# Patient Record
Sex: FEMALE | Race: WHITE | Employment: UNEMPLOYED | URBAN - METROPOLITAN AREA
[De-identification: names, ages, dates, MRNs, and addresses within clinical notes are randomized per-mention and may not be internally consistent; named-entity substitution may affect disease eponyms.]

---

## 2020-09-03 VITALS
TEMPERATURE: 97.8 F | HEART RATE: 76 BPM | WEIGHT: 160.6 LBS | HEIGHT: 64 IN | OXYGEN SATURATION: 97 % | BODY MASS INDEX: 27.42 KG/M2 | DIASTOLIC BLOOD PRESSURE: 75 MMHG | SYSTOLIC BLOOD PRESSURE: 132 MMHG

## 2020-09-03 RX ORDER — POTASSIUM CHLORIDE 20 MEQ/1
TABLET, EXTENDED RELEASE ORAL 2 TIMES DAILY
COMMUNITY

## 2020-09-03 RX ORDER — AMLODIPINE BESYLATE 10 MG/1
TABLET ORAL DAILY
COMMUNITY

## 2020-09-03 RX ORDER — FUROSEMIDE 20 MG/1
TABLET ORAL DAILY
COMMUNITY

## 2020-09-03 RX ORDER — POTASSIUM CHLORIDE 750 MG/1
TABLET, FILM COATED, EXTENDED RELEASE ORAL
COMMUNITY
End: 2022-02-10 | Stop reason: CLARIF

## 2021-10-13 ENCOUNTER — OFFICE VISIT (OUTPATIENT)
Dept: PODIATRY | Age: 86
End: 2021-10-13
Payer: MEDICARE

## 2021-10-13 VITALS
BODY MASS INDEX: 27.14 KG/M2 | TEMPERATURE: 96.6 F | SYSTOLIC BLOOD PRESSURE: 160 MMHG | HEIGHT: 64 IN | WEIGHT: 159 LBS | HEART RATE: 95 BPM | OXYGEN SATURATION: 98 % | DIASTOLIC BLOOD PRESSURE: 71 MMHG

## 2021-10-13 DIAGNOSIS — M20.11 HAV (HALLUX ABDUCTO VALGUS), RIGHT: ICD-10-CM

## 2021-10-13 DIAGNOSIS — M20.61 TOE DEFORMITY, ACQUIRED, RIGHT: Primary | ICD-10-CM

## 2021-10-13 PROCEDURE — 29550 STRAPPING OF TOES: CPT | Performed by: PODIATRIST

## 2021-10-13 PROCEDURE — G8427 DOCREV CUR MEDS BY ELIG CLIN: HCPCS | Performed by: PODIATRIST

## 2021-10-13 PROCEDURE — G8419 CALC BMI OUT NRM PARAM NOF/U: HCPCS | Performed by: PODIATRIST

## 2021-10-13 PROCEDURE — 1101F PT FALLS ASSESS-DOCD LE1/YR: CPT | Performed by: PODIATRIST

## 2021-10-13 PROCEDURE — G8510 SCR DEP NEG, NO PLAN REQD: HCPCS | Performed by: PODIATRIST

## 2021-10-13 PROCEDURE — 1090F PRES/ABSN URINE INCON ASSESS: CPT | Performed by: PODIATRIST

## 2021-10-13 PROCEDURE — G8536 NO DOC ELDER MAL SCRN: HCPCS | Performed by: PODIATRIST

## 2021-10-13 PROCEDURE — 99213 OFFICE O/P EST LOW 20 MIN: CPT | Performed by: PODIATRIST

## 2021-10-13 RX ORDER — SPIRONOLACTONE 100 MG/1
25 TABLET, FILM COATED ORAL DAILY
COMMUNITY

## 2021-10-13 NOTE — PROGRESS NOTES
[facility]    Subjective:         Patient is a 80 y.o. female who is being seen as a new pt for right foot pain. Patient states the pain is level of 3 out of 10. States pain is exacerbated with close her shoes and weightbearing. She denies any overt trauma. She states she did have surgery previously to remove a bunion deformity but she believes the deformity has returned. She also states she has an overlapping toe. She denies any changes in her activity level or shoe gear. She denies any recent diagnostic testing to interrogate her areas of concern. She denies any other pedal complaints    Past Medical History:   Diagnosis Date    Arthritis     Hx of colonoscopy with polypectomy     benign    Hypertension     Mitral valve regurgitation 2-14-11    mild per Dr. Ruddy Sofia Other ill-defined conditions     cataracts    Other ill-defined conditions 1-1-03    fx left ankle-boot only    Pneumonia ~ 7 yrs ago    Thyroid disease     hypothyroidism    Varicose veins     left leg worse than right currently     Past Surgical History:   Procedure Laterality Date    BREAST SURGERY PROCEDURE UNLISTED  1986    excision calcium deposit left breast    HX APPENDECTOMY      HX KNEE ARTHROSCOPY  8-29-07    right    HX OPEN CHOLECYSTECTOMY  1962    HX ORTHOPAEDIC  4-12-96    shahid. kelly's neuroma    HX ORTHOPAEDIC      right foot bunionectomy    HX ORTHOPAEDIC  7-11-01    removal of left foot heel spur    HX TONSILLECTOMY      HX TUBAL LIGATION      TOTAL KNEE ARTHROPLASTY  1-14-08    left    VASCULAR SURGERY PROCEDURE UNLIST  1979    left leg vein stripping       No family history on file. Social History     Tobacco Use    Smoking status: Never Smoker    Smokeless tobacco: Never Used   Substance Use Topics    Alcohol use: No     Allergies   Allergen Reactions    Prednisone Rash and Itching     Prior to Admission medications    Medication Sig Start Date End Date Taking? Authorizing Provider   spironolactone (ALDACTONE) 100 mg tablet Take  by mouth daily. Yes Provider, Historical   spironolactone (ALDACTONE) 10 mg/1 mL susp 10 mg/mL oral suspension (compounded) Take  by mouth. Yes Provider, Historical   amLODIPine (NORVASC) 10 mg tablet Take  by mouth daily. Yes Provider, Historical   furosemide (LASIX) 20 mg tablet Take  by mouth daily. Yes Provider, Historical   POTASSIUM PO Take  by mouth. Yes Provider, Historical   potassium chloride SR (KLOR-CON 10) 10 mEq tablet Take  by mouth. Yes Provider, Historical   potassium chloride (K-DUR, KLOR-CON) 20 mEq tablet Take  by mouth two (2) times a day. Yes Provider, Historical   CALCIUM CARBONATE/VITAMIN D3 (CALCIUM 600 + D,3, PO) Take 1 Tab by mouth two (2) times a day. Yes Provider, Historical   HYDROcodone-acetaminophen (LORTAB) 5-500 mg per tablet Take 1-2 Tabs by mouth every four (4) hours as needed for Pain. Patient not taking: Reported on 10/13/2021 2/8/12   Zuleika Melgoza MD   levothyroxine (SYNTHROID) 25 mcg tablet Take 25 mcg by mouth Daily (before breakfast). Indications: HYPOTHYROIDISM  Patient not taking: Reported on 10/13/2021    Provider, Historical   atenolol (TENORMIN) 100 mg tablet Take 100 mg by mouth daily. Indications: HYPERTENSION  Patient not taking: Reported on 10/13/2021    Provider, Historical   hydrochlorothiazide (HYDRODIURIL) 25 mg tablet Take 25 mg by mouth daily. Indications: HYPERTENSION  Patient not taking: Reported on 10/13/2021    Provider, Historical   lisinopril (PRINIVIL, ZESTRIL) 10 mg tablet Take 10 mg by mouth nightly. Indications: HYPERTENSION  Patient not taking: Reported on 10/13/2021    Provider, Historical   pravastatin (PRAVACHOL) 20 mg tablet Take 20 mg by mouth nightly. Indications: HYPERCHOLESTEROLEMIA  Patient not taking: Reported on 10/13/2021    Provider, Historical   aspirin 81 mg tablet Take 81 mg by mouth daily.     Patient not taking: Reported on 10/13/2021    Provider, Historical   multivitamin (ONE A DAY) tablet Take 1 Tab by mouth daily. Patient not taking: Reported on 10/13/2021    Provider, Historical   omega-3 fatty acids-vitamin e (FISH OIL) 1,000 mg Cap Take 1 Cap by mouth daily. Patient not taking: Reported on 10/13/2021    Provider, Historical   GLUCOSAMINE HCL/CHONDR SANFORD A NA (GLUCOSAMINE-CHONDROITIN) 750-600 mg Tab Take 2 Tabs by mouth daily. Patient not taking: Reported on 10/13/2021    Provider, Historical   OTHER Take 1,000 mg by mouth daily. Cinnamon and chromium     Provider, Historical       Review of Systems   Constitutional: Negative. HENT: Negative. Eyes: Negative. Respiratory: Negative. Cardiovascular: Positive for leg swelling. Gastrointestinal: Negative. Endocrine: Positive for cold intolerance. Genitourinary: Negative. Musculoskeletal: Positive for arthralgias. Skin: Negative. Allergic/Immunologic: Negative. Neurological: Negative. Hematological: Negative. Psychiatric/Behavioral: Negative. All other systems reviewed and are negative. Objective:     Visit Vitals  BP (!) 160/71 (BP 1 Location: Left upper arm, BP Patient Position: Sitting, BP Cuff Size: Large adult)   Pulse 95   Temp (!) 96.6 °F (35.9 °C) (Temporal)   Ht 5' 4\" (1.626 m)   Wt 159 lb (72.1 kg)   SpO2 98%   BMI 27.29 kg/m²       Physical Exam  Vitals reviewed. Constitutional:       Appearance: She is overweight. Cardiovascular:      Pulses:           Dorsalis pedis pulses are 2+ on the right side and 2+ on the left side. Posterior tibial pulses are 2+ on the right side and 2+ on the left side. Pulmonary:      Effort: Pulmonary effort is normal.   Musculoskeletal:      Right lower leg: No edema. Left lower leg: No edema. Right foot: Decreased range of motion. Deformity and bunion present. No Charcot foot. Left foot: Normal range of motion.  No deformity, bunion, Charcot foot or foot drop.   Feet:      Right foot:      Protective Sensation: 10 sites tested. 10 sites sensed. Skin integrity: Skin integrity normal.      Toenail Condition: Right toenails are normal.      Left foot:      Protective Sensation: 10 sites tested. 10 sites sensed. Skin integrity: Skin integrity normal.      Toenail Condition: Left toenails are normal.   Lymphadenopathy:      Lower Body: No right inguinal adenopathy. No left inguinal adenopathy. Skin:     General: Skin is warm. Capillary Refill: Capillary refill takes 2 to 3 seconds. Neurological:      Mental Status: She is alert and oriented to person, place, and time. Psychiatric:         Mood and Affect: Mood and affect normal.         Behavior: Behavior is cooperative. Data Review: No results found for this or any previous visit (from the past 24 hour(s)). Impression:       ICD-10-CM ICD-9-CM    1. Toe deformity, acquired, right  M20.61 735.9    2. Hav (hallux abducto valgus), right  M20.11 735.0        Recommendation:     Patient seen and evaluated in the office  Discussed and educated patient regarding her current medical condition  Treatment options reviewed, conservative versus procedural. Possible complications of each reviewed. Patient elected for conservative treatment at this time. A toe strapping with coban was performed to the right second toe without incident. Patient to perform as needed for symptomatic relief        Casper Resendiz, [address] and [facility]  [address] [place] [address] [address], [address]  O: [phone]  F: [phone]  C: [phone]

## 2021-10-13 NOTE — PROGRESS NOTES
Chief Complaint   Patient presents with    Callouses     R foot; also states R middle toe overlaps her great toe; states she giraldo bunion surgery and bunion came back     1. Have you been to the ER, urgent care clinic since your last visit? Hospitalized since your last visit? No    2. Have you seen or consulted any other health care providers outside of the [address] [place] [address] since your last visit? Include any pap smears or colon screening.  No  PCP-Dr Sabillon

## 2022-01-06 ENCOUNTER — OFFICE VISIT (OUTPATIENT)
Dept: PODIATRY | Age: 87
End: 2022-01-06
Payer: MEDICARE

## 2022-01-06 ENCOUNTER — HOSPITAL ENCOUNTER (OUTPATIENT)
Dept: GENERAL RADIOLOGY | Age: 87
Discharge: HOME | End: 2022-01-06
Payer: MEDICARE

## 2022-01-06 VITALS
DIASTOLIC BLOOD PRESSURE: 72 MMHG | TEMPERATURE: 97.7 F | HEIGHT: 64 IN | BODY MASS INDEX: 25.81 KG/M2 | HEART RATE: 87 BPM | SYSTOLIC BLOOD PRESSURE: 160 MMHG | WEIGHT: 151.2 LBS

## 2022-01-06 DIAGNOSIS — M20.11 HAV (HALLUX ABDUCTO VALGUS), RIGHT: ICD-10-CM

## 2022-01-06 DIAGNOSIS — M20.41 HAMMERTOE OF SECOND TOE OF RIGHT FOOT: Primary | ICD-10-CM

## 2022-01-06 DIAGNOSIS — M20.41 HAMMERTOE OF SECOND TOE OF RIGHT FOOT: ICD-10-CM

## 2022-01-06 PROCEDURE — G8432 DEP SCR NOT DOC, RNG: HCPCS | Performed by: PODIATRIST

## 2022-01-06 PROCEDURE — 73630 X-RAY EXAM OF FOOT: CPT

## 2022-01-06 PROCEDURE — 1101F PT FALLS ASSESS-DOCD LE1/YR: CPT | Performed by: PODIATRIST

## 2022-01-06 PROCEDURE — 1090F PRES/ABSN URINE INCON ASSESS: CPT | Performed by: PODIATRIST

## 2022-01-06 PROCEDURE — G8427 DOCREV CUR MEDS BY ELIG CLIN: HCPCS | Performed by: PODIATRIST

## 2022-01-06 PROCEDURE — G8419 CALC BMI OUT NRM PARAM NOF/U: HCPCS | Performed by: PODIATRIST

## 2022-01-06 PROCEDURE — G8536 NO DOC ELDER MAL SCRN: HCPCS | Performed by: PODIATRIST

## 2022-01-06 PROCEDURE — 99213 OFFICE O/P EST LOW 20 MIN: CPT | Performed by: PODIATRIST

## 2022-01-06 NOTE — PROGRESS NOTES
[facility]    Subjective:         Patient is a 80 y.o. female who is being seen as a returning pt for right foot pain. Patient states the pain is level of 3 out of 10. States pain is exacerbated with close her shoes and weightbearing. She denies any overt trauma. She states she did have surgery previously to remove a bunion deformity but she believes the deformity has returned. She also states she has an overlapping toe. She denies any changes in her activity level or shoe gear. She denies any recent diagnostic testing to interrogate her areas of concern. She states additional surgery was discussed last office visit but she had decided upon conservative treatment. She denies any other pedal complaints    Past Medical History:   Diagnosis Date    Arthritis     Hx of colonoscopy with polypectomy     benign    Hypertension     Mitral valve regurgitation 2-14-11    mild per Dr. Joaquín Araujo Other ill-defined conditions(799.89)     cataracts    Other ill-defined conditions(799.89) 1-1-03    fx left ankle-boot only    Pneumonia ~ 7 yrs ago    Thyroid disease     hypothyroidism    Varicose veins     left leg worse than right currently     Past Surgical History:   Procedure Laterality Date    HX APPENDECTOMY      HX KNEE ARTHROSCOPY  8-29-07    right    HX OPEN CHOLECYSTECTOMY  1962    HX ORTHOPAEDIC  4-12-96    shahid. kelly's neuroma    HX ORTHOPAEDIC      right foot bunionectomy    HX ORTHOPAEDIC  7-11-01    removal of left foot heel spur    HX TONSILLECTOMY      HX TUBAL LIGATION      GA BREAST SURGERY PROCEDURE UNLISTED  1986    excision calcium deposit left breast    GA TOTAL KNEE ARTHROPLASTY  1-14-08    left    VASCULAR SURGERY PROCEDURE UNLIST  1979    left leg vein stripping       History reviewed. No pertinent family history.    Social History     Tobacco Use    Smoking status: Never Smoker    Smokeless tobacco: Never Used   Substance Use Topics    Alcohol use: No     Allergies   Allergen Reactions    Prednisone Rash and Itching     Prior to Admission medications    Medication Sig Start Date End Date Taking? Authorizing Provider   spironolactone (ALDACTONE) 100 mg tablet Take  by mouth daily. Provider, Historical   spironolactone (ALDACTONE) 10 mg/1 mL susp 10 mg/mL oral suspension (compounded) Take  by mouth. Provider, Historical   amLODIPine (NORVASC) 10 mg tablet Take  by mouth daily. Provider, Historical   furosemide (LASIX) 20 mg tablet Take  by mouth daily. Provider, Historical   POTASSIUM PO Take  by mouth. Provider, Historical   potassium chloride SR (KLOR-CON 10) 10 mEq tablet Take  by mouth. Provider, Historical   potassium chloride (K-DUR, KLOR-CON) 20 mEq tablet Take  by mouth two (2) times a day. Provider, Historical   HYDROcodone-acetaminophen (LORTAB) 5-500 mg per tablet Take 1-2 Tabs by mouth every four (4) hours as needed for Pain. Patient not taking: Reported on 10/13/2021 2/8/12   Janeth Narayanan MD   levothyroxine (SYNTHROID) 25 mcg tablet Take 25 mcg by mouth Daily (before breakfast). Indications: HYPOTHYROIDISM  Patient not taking: Reported on 10/13/2021    Provider, Historical   atenolol (TENORMIN) 100 mg tablet Take 100 mg by mouth daily. Indications: HYPERTENSION  Patient not taking: Reported on 10/13/2021    Provider, Historical   hydrochlorothiazide (HYDRODIURIL) 25 mg tablet Take 25 mg by mouth daily. Indications: HYPERTENSION  Patient not taking: Reported on 10/13/2021    Provider, Historical   lisinopril (PRINIVIL, ZESTRIL) 10 mg tablet Take 10 mg by mouth nightly. Indications: HYPERTENSION  Patient not taking: Reported on 10/13/2021    Provider, Historical   pravastatin (PRAVACHOL) 20 mg tablet Take 20 mg by mouth nightly. Indications: HYPERCHOLESTEROLEMIA  Patient not taking: Reported on 10/13/2021    Provider, Historical   aspirin 81 mg tablet Take 81 mg by mouth daily.     Patient not taking: Reported on 10/13/2021    Provider, Historical   multivitamin (ONE A DAY) tablet Take 1 Tab by mouth daily. Patient not taking: Reported on 10/13/2021    Provider, Historical   CALCIUM CARBONATE/VITAMIN D3 (CALCIUM 600 + D,3, PO) Take 1 Tab by mouth two (2) times a day. Provider, Historical   omega-3 fatty acids-vitamin e (FISH OIL) 1,000 mg Cap Take 1 Cap by mouth daily. Patient not taking: Reported on 10/13/2021    Provider, Historical   GLUCOSAMINE HCL/CHONDR SANFORD A NA (GLUCOSAMINE-CHONDROITIN) 750-600 mg Tab Take 2 Tabs by mouth daily. Patient not taking: Reported on 10/13/2021    Provider, Historical   OTHER Take 1,000 mg by mouth daily. Cinnamon and chromium     Provider, Historical       Review of Systems   Constitutional: Negative. HENT: Negative. Eyes: Negative. Respiratory: Negative. Cardiovascular: Positive for leg swelling. Gastrointestinal: Negative. Endocrine: Positive for cold intolerance. Genitourinary: Negative. Musculoskeletal: Positive for arthralgias. Skin: Negative. Allergic/Immunologic: Negative. Neurological: Negative. Hematological: Negative. Psychiatric/Behavioral: Negative. All other systems reviewed and are negative. Objective:     Visit Vitals  BP (!) 160/72 (BP 1 Location: Left upper arm, BP Patient Position: Sitting, BP Cuff Size: Adult)   Pulse 87   Temp 97.7 °F (36.5 °C) (Temporal)   Ht 5' 4\" (1.626 m)   Wt 151 lb 3.2 oz (68.6 kg)   BMI 25.95 kg/m²       Physical Exam  Vitals reviewed. Constitutional:       Appearance: She is overweight. Cardiovascular:      Pulses:           Dorsalis pedis pulses are 2+ on the right side and 2+ on the left side. Posterior tibial pulses are 2+ on the right side and 2+ on the left side. Pulmonary:      Effort: Pulmonary effort is normal.   Musculoskeletal:      Right lower leg: No edema. Left lower leg: No edema. Right foot: Decreased range of motion.  Deformity and bunion present. No Charcot foot. Left foot: Normal range of motion. No deformity, bunion, Charcot foot or foot drop. Feet:      Right foot:      Protective Sensation: 10 sites tested. 10 sites sensed. Skin integrity: Skin integrity normal.      Toenail Condition: Right toenails are normal.      Left foot:      Protective Sensation: 10 sites tested. 10 sites sensed. Skin integrity: Skin integrity normal.      Toenail Condition: Left toenails are normal.   Lymphadenopathy:      Lower Body: No right inguinal adenopathy. No left inguinal adenopathy. Skin:     General: Skin is warm. Capillary Refill: Capillary refill takes 2 to 3 seconds. Neurological:      Mental Status: She is alert and oriented to person, place, and time. Psychiatric:         Mood and Affect: Mood and affect normal.         Behavior: Behavior is cooperative. Data Review: No results found for this or any previous visit (from the past 24 hour(s)). Impression:       ICD-10-CM ICD-9-CM    1. Hammertoe of second toe of right foot  M20.41 735.4 XR FOOT RT MIN 3 V   2. Hav (hallux abducto valgus), right  M20.11 735.0 XR FOOT RT MIN 3 V         Recommendation:     Patient seen and evaluated in the office  Discussed and educated patient regarding her current medical condition  As patient feels she has failed conservative treatment and she would like to possibly move forward with surgical intervention. X-rays ordered to evaluate right foot deformities and we will discuss treatment options in more depth when imaging has been performed        Casper Mora, [address] [facility], [address] [place] [address] and [facility]  [address] [place] [address]  O: [phone]  F: [phone]  C: [phone]

## 2022-01-06 NOTE — PROGRESS NOTES
1. Have you been to the ER, urgent care clinic since your last visit? Hospitalized since your last visit? No    2. Have you seen or consulted any other health care providers outside of the [address] [place] [address] since your last visit? Include any pap smears or colon screening.  No     Chief Complaint   Patient presents with    Foot Pain     R foot    Foot Swelling

## 2022-01-20 ENCOUNTER — OFFICE VISIT (OUTPATIENT)
Dept: PODIATRY | Age: 87
End: 2022-01-20
Payer: MEDICARE

## 2022-01-20 VITALS
BODY MASS INDEX: 25.78 KG/M2 | DIASTOLIC BLOOD PRESSURE: 75 MMHG | HEART RATE: 76 BPM | WEIGHT: 151 LBS | HEIGHT: 64 IN | SYSTOLIC BLOOD PRESSURE: 132 MMHG | TEMPERATURE: 97.3 F

## 2022-01-20 DIAGNOSIS — M20.11 HAV (HALLUX ABDUCTO VALGUS), RIGHT: Primary | ICD-10-CM

## 2022-01-20 DIAGNOSIS — M20.41 HAMMERTOE OF RIGHT FOOT: ICD-10-CM

## 2022-01-20 PROCEDURE — G8432 DEP SCR NOT DOC, RNG: HCPCS | Performed by: PODIATRIST

## 2022-01-20 PROCEDURE — G8419 CALC BMI OUT NRM PARAM NOF/U: HCPCS | Performed by: PODIATRIST

## 2022-01-20 PROCEDURE — G8427 DOCREV CUR MEDS BY ELIG CLIN: HCPCS | Performed by: PODIATRIST

## 2022-01-20 PROCEDURE — 99214 OFFICE O/P EST MOD 30 MIN: CPT | Performed by: PODIATRIST

## 2022-01-20 PROCEDURE — 1101F PT FALLS ASSESS-DOCD LE1/YR: CPT | Performed by: PODIATRIST

## 2022-01-20 PROCEDURE — G8536 NO DOC ELDER MAL SCRN: HCPCS | Performed by: PODIATRIST

## 2022-01-20 PROCEDURE — 1090F PRES/ABSN URINE INCON ASSESS: CPT | Performed by: PODIATRIST

## 2022-01-20 NOTE — PROGRESS NOTES
1. Have you been to the ER, urgent care clinic since your last visit? Hospitalized since your last visit? No    2. Have you seen or consulted any other health care providers outside of the [address] [place] [address] since your last visit? Include any pap smears or colon screening.  No     Chief Complaint   Patient presents with    Results     x-ray results

## 2022-02-07 NOTE — PROGRESS NOTES
[facility]    Subjective:         Patient is a 80 y.o. female who is being seen as a returning pt for right foot pain. She states she is gone for x-rays and would like to discuss the findings. Patient states the pain is level of 2 out of 10. States pain is exacerbated with close her shoes and weightbearing. She denies any overt trauma. She denies any changes in her activity level or shoe gear. She denies any other pedal complaints    Past Medical History:   Diagnosis Date    Arthritis     Hx of colonoscopy with polypectomy     benign    Hypertension     Mitral valve regurgitation 2-14-11    mild per Dr. Edwin Moreler Other ill-defined conditions(799.89)     cataracts    Other ill-defined conditions(799.89) 1-1-03    fx left ankle-boot only    Pneumonia ~ 7 yrs ago    Thyroid disease     hypothyroidism    Varicose veins     left leg worse than right currently     Past Surgical History:   Procedure Laterality Date    HX APPENDECTOMY      HX KNEE ARTHROSCOPY  8-29-07    right    HX OPEN CHOLECYSTECTOMY  1962    HX ORTHOPAEDIC  4-12-96    shahid. kelly's neuroma    HX ORTHOPAEDIC      right foot bunionectomy    HX ORTHOPAEDIC  7-11-01    removal of left foot heel spur    HX TONSILLECTOMY      HX TUBAL LIGATION      OR BREAST SURGERY PROCEDURE UNLISTED  1986    excision calcium deposit left breast    OR TOTAL KNEE ARTHROPLASTY  1-14-08    left    VASCULAR SURGERY PROCEDURE UNLIST  1979    left leg vein stripping       History reviewed. No pertinent family history. Social History     Tobacco Use    Smoking status: Never Smoker    Smokeless tobacco: Never Used   Substance Use Topics    Alcohol use: No     Allergies   Allergen Reactions    Prednisone Rash and Itching     Prior to Admission medications    Medication Sig Start Date End Date Taking? Authorizing Provider   spironolactone (ALDACTONE) 100 mg tablet Take  by mouth daily.     Provider, Historical spironolactone (ALDACTONE) 10 mg/1 mL susp 10 mg/mL oral suspension (compounded) Take  by mouth. Provider, Historical   amLODIPine (NORVASC) 10 mg tablet Take  by mouth daily. Provider, Historical   furosemide (LASIX) 20 mg tablet Take  by mouth daily. Provider, Historical   POTASSIUM PO Take  by mouth. Provider, Historical   potassium chloride SR (KLOR-CON 10) 10 mEq tablet Take  by mouth. Provider, Historical   potassium chloride (K-DUR, KLOR-CON) 20 mEq tablet Take  by mouth two (2) times a day. Provider, Historical   HYDROcodone-acetaminophen (LORTAB) 5-500 mg per tablet Take 1-2 Tabs by mouth every four (4) hours as needed for Pain. Patient not taking: Reported on 10/13/2021 2/8/12   Geetha Carranza MD   levothyroxine (SYNTHROID) 25 mcg tablet Take 25 mcg by mouth Daily (before breakfast). Indications: HYPOTHYROIDISM  Patient not taking: Reported on 10/13/2021    Provider, Historical   atenolol (TENORMIN) 100 mg tablet Take 100 mg by mouth daily. Indications: HYPERTENSION  Patient not taking: Reported on 10/13/2021    Provider, Historical   hydrochlorothiazide (HYDRODIURIL) 25 mg tablet Take 25 mg by mouth daily. Indications: HYPERTENSION  Patient not taking: Reported on 10/13/2021    Provider, Historical   lisinopril (PRINIVIL, ZESTRIL) 10 mg tablet Take 10 mg by mouth nightly. Indications: HYPERTENSION  Patient not taking: Reported on 10/13/2021    Provider, Historical   pravastatin (PRAVACHOL) 20 mg tablet Take 20 mg by mouth nightly. Indications: HYPERCHOLESTEROLEMIA  Patient not taking: Reported on 10/13/2021    Provider, Historical   aspirin 81 mg tablet Take 81 mg by mouth daily. Patient not taking: Reported on 10/13/2021    Provider, Historical   multivitamin (ONE A DAY) tablet Take 1 Tab by mouth daily.     Patient not taking: Reported on 10/13/2021    Provider, Historical   CALCIUM CARBONATE/VITAMIN D3 (CALCIUM 600 + D,3, PO) Take 1 Tab by mouth two (2) times a day. Provider, Historical   omega-3 fatty acids-vitamin e (FISH OIL) 1,000 mg Cap Take 1 Cap by mouth daily. Patient not taking: Reported on 10/13/2021    Provider, Historical   GLUCOSAMINE HCL/CHONDR SANFORD A NA (GLUCOSAMINE-CHONDROITIN) 750-600 mg Tab Take 2 Tabs by mouth daily. Patient not taking: Reported on 10/13/2021    Provider, Historical   OTHER Take 1,000 mg by mouth daily. Cinnamon and chromium     Provider, Historical       Review of Systems   Constitutional: Negative. HENT: Negative. Eyes: Negative. Respiratory: Negative. Cardiovascular: Positive for leg swelling. Gastrointestinal: Negative. Endocrine: Positive for cold intolerance. Genitourinary: Negative. Musculoskeletal: Positive for arthralgias. Skin: Negative. Allergic/Immunologic: Negative. Neurological: Negative. Hematological: Negative. Psychiatric/Behavioral: Negative. All other systems reviewed and are negative. Objective:     Visit Vitals  /75 (BP 1 Location: Left upper arm, BP Patient Position: Sitting, BP Cuff Size: Adult)   Pulse 76   Temp 97.3 °F (36.3 °C) (Temporal)   Ht 5' 4\" (1.626 m)   Wt 151 lb (68.5 kg)   BMI 25.92 kg/m²       Physical Exam  Vitals reviewed. Constitutional:       Appearance: She is overweight. Cardiovascular:      Pulses:           Dorsalis pedis pulses are 2+ on the right side and 2+ on the left side. Posterior tibial pulses are 2+ on the right side and 2+ on the left side. Pulmonary:      Effort: Pulmonary effort is normal.   Musculoskeletal:      Right lower leg: No edema. Left lower leg: No edema. Right foot: Decreased range of motion. Deformity and bunion present. No Charcot foot. Left foot: Normal range of motion. No deformity, bunion, Charcot foot or foot drop. Feet:      Right foot:      Protective Sensation: 10 sites tested. 10 sites sensed.       Skin integrity: Skin integrity normal.      Toenail Condition: Right toenails are normal.      Left foot:      Protective Sensation: 10 sites tested. 10 sites sensed. Skin integrity: Skin integrity normal.      Toenail Condition: Left toenails are normal.   Lymphadenopathy:      Lower Body: No right inguinal adenopathy. No left inguinal adenopathy. Skin:     General: Skin is warm. Capillary Refill: Capillary refill takes 2 to 3 seconds. Neurological:      Mental Status: She is alert and oriented to person, place, and time. Psychiatric:         Mood and Affect: Mood and affect normal.         Behavior: Behavior is cooperative. Data Review:   Right foot. 3 views from 01/06/2022  Comparison 15 Elizabeth, 2020. Prior bunion surgery with first metatarsal and great toe proximal phalanx  osteotomies again noted. Osteotomies are healed. No hardware, patient is  evident. Bunion remains, with degenerative change at the first MTP joint. Second toe proximal phalanx is displaced dorsally. Second metatarsal head is  deformed. No acute fracture, dislocation or concerning lytic or blastic lesion. Impression:       ICD-10-CM ICD-9-CM    1. Hav (hallux abducto valgus), right  M20.11 735.0    2. Hammertoe of right foot  M20.41 735.4        Recommendation:     Patient seen and evaluated in the office  Discussed and educated patient regarding her current medical condition  Personally reviewed x-ray images of the right foot discussed findings with patient. Treatment options discussed, conservative versus procedural.  Possible complications of each reviewed. Patient elected for procedural intervention at this time. Thoroughly reviewed preoperative, intraoperative and postoperative surgical protocols. Patient given a form to be completed by her PCP for preoperative surgical clearance. Casper Cadena DPM, [address] [facility]  [address] [place] [address]  O: [phone]  F: [phone]  C: [phone]

## 2022-02-10 RX ORDER — ASCORBIC ACID 500 MG
500 TABLET ORAL DAILY
COMMUNITY

## 2022-02-10 RX ORDER — MULTIVITAMIN
TABLET ORAL DAILY
COMMUNITY

## 2022-02-10 RX ORDER — MELATONIN
1000 DAILY
COMMUNITY

## 2022-02-14 ENCOUNTER — HOSPITAL ENCOUNTER (OUTPATIENT)
Dept: PREADMISSION TESTING | Age: 87
Discharge: HOME | End: 2022-02-14
Payer: MEDICARE

## 2022-02-14 LAB
SARS-COV-2, COV2: NORMAL
SARS-COV-2, XPLCVT: NOT DETECTED
SOURCE, COVRS: NORMAL

## 2022-02-14 PROCEDURE — U0005 INFEC AGEN DETEC AMPLI PROBE: HCPCS

## 2022-02-18 ENCOUNTER — APPOINTMENT (OUTPATIENT)
Dept: GENERAL RADIOLOGY | Age: 87
End: 2022-02-18
Attending: PODIATRIST
Payer: MEDICARE

## 2022-02-18 ENCOUNTER — ANESTHESIA EVENT (OUTPATIENT)
Dept: SURGERY | Age: 87
End: 2022-02-18
Payer: MEDICARE

## 2022-02-18 ENCOUNTER — HOSPITAL ENCOUNTER (OUTPATIENT)
Age: 87
Setting detail: OUTPATIENT SURGERY
Discharge: HOME | End: 2022-02-18
Attending: PODIATRIST | Admitting: INTERNAL MEDICINE
Payer: MEDICARE

## 2022-02-18 ENCOUNTER — ANESTHESIA (OUTPATIENT)
Dept: SURGERY | Age: 87
End: 2022-02-18
Payer: MEDICARE

## 2022-02-18 VITALS
HEART RATE: 93 BPM | HEIGHT: 64 IN | SYSTOLIC BLOOD PRESSURE: 93 MMHG | WEIGHT: 147 LBS | TEMPERATURE: 98.2 F | BODY MASS INDEX: 25.1 KG/M2 | OXYGEN SATURATION: 95 % | RESPIRATION RATE: 16 BRPM | DIASTOLIC BLOOD PRESSURE: 60 MMHG

## 2022-02-18 DIAGNOSIS — M20.11 HAV (HALLUX ABDUCTO VALGUS), RIGHT: Primary | ICD-10-CM

## 2022-02-18 LAB
ANION GAP SERPL CALC-SCNC: 9 MMOL/L (ref 5–15)
ATRIAL RATE: 96 BPM
BUN SERPL-MCNC: 31 MG/DL (ref 6–20)
BUN/CREAT SERPL: 16 (ref 12–20)
CA-I BLD-MCNC: 9.7 MG/DL (ref 8.5–10.1)
CALCULATED P AXIS, ECG09: 62 DEGREES
CALCULATED R AXIS, ECG10: 5 DEGREES
CALCULATED T AXIS, ECG11: 38 DEGREES
CHLORIDE SERPL-SCNC: 99 MMOL/L (ref 97–108)
CO2 SERPL-SCNC: 27 MMOL/L (ref 21–32)
CREAT SERPL-MCNC: 1.88 MG/DL (ref 0.55–1.02)
DIAGNOSIS, 93000: NORMAL
ERYTHROCYTE [DISTWIDTH] IN BLOOD BY AUTOMATED COUNT: 12.7 % (ref 11.5–14.5)
GLUCOSE SERPL-MCNC: 88 MG/DL (ref 65–100)
HCT VFR BLD AUTO: 40.1 % (ref 35–47)
HGB BLD-MCNC: 13.3 G/DL (ref 11.5–16)
MCH RBC QN AUTO: 31 PG (ref 26–34)
MCHC RBC AUTO-ENTMCNC: 33.2 G/DL (ref 30–36.5)
MCV RBC AUTO: 93.5 FL (ref 80–99)
NRBC # BLD: 0 K/UL (ref 0–0.01)
NRBC BLD-RTO: 0 PER 100 WBC
P-R INTERVAL, ECG05: 170 MS
PLATELET # BLD AUTO: 271 K/UL (ref 150–400)
PMV BLD AUTO: 10.1 FL (ref 8.9–12.9)
POTASSIUM SERPL-SCNC: 3.7 MMOL/L (ref 3.5–5.1)
Q-T INTERVAL, ECG07: 370 MS
QRS DURATION, ECG06: 84 MS
QTC CALCULATION (BEZET), ECG08: 467 MS
RBC # BLD AUTO: 4.29 M/UL (ref 3.8–5.2)
SODIUM SERPL-SCNC: 135 MMOL/L (ref 136–145)
VENTRICULAR RATE, ECG03: 96 BPM
WBC # BLD AUTO: 6.6 K/UL (ref 3.6–11)

## 2022-02-18 PROCEDURE — 74011250636 HC RX REV CODE- 250/636: Performed by: ANESTHESIOLOGY

## 2022-02-18 PROCEDURE — 2709999900 HC NON-CHARGEABLE SUPPLY: Performed by: PODIATRIST

## 2022-02-18 PROCEDURE — C1713 ANCHOR/SCREW BN/BN,TIS/BN: HCPCS | Performed by: PODIATRIST

## 2022-02-18 PROCEDURE — 77030003746: Performed by: PODIATRIST

## 2022-02-18 PROCEDURE — 76060000035 HC ANESTHESIA 2 TO 2.5 HR: Performed by: PODIATRIST

## 2022-02-18 PROCEDURE — 28285 REPAIR OF HAMMERTOE: CPT | Performed by: PODIATRIST

## 2022-02-18 PROCEDURE — 76000 FLUOROSCOPY <1 HR PHYS/QHP: CPT | Performed by: PODIATRIST

## 2022-02-18 PROCEDURE — 74011250636 HC RX REV CODE- 250/636: Performed by: PODIATRIST

## 2022-02-18 PROCEDURE — 93005 ELECTROCARDIOGRAM TRACING: CPT

## 2022-02-18 PROCEDURE — 74011000250 HC RX REV CODE- 250: Performed by: PODIATRIST

## 2022-02-18 PROCEDURE — 80048 BASIC METABOLIC PNL TOTAL CA: CPT

## 2022-02-18 PROCEDURE — 28112 PART REMOVAL OF METATARSAL: CPT | Performed by: PODIATRIST

## 2022-02-18 PROCEDURE — 85027 COMPLETE CBC AUTOMATED: CPT

## 2022-02-18 PROCEDURE — 77030038850 HC DRL GUIDE VARX STRY -C: Performed by: PODIATRIST

## 2022-02-18 PROCEDURE — 76210000026 HC REC RM PH II 1 TO 1.5 HR: Performed by: PODIATRIST

## 2022-02-18 PROCEDURE — 77030038170 HC RMR ANCHRG STRY -G1: Performed by: PODIATRIST

## 2022-02-18 PROCEDURE — 77030039497 HC CST PAD STERILE CHCS -A: Performed by: PODIATRIST

## 2022-02-18 PROCEDURE — 36415 COLL VENOUS BLD VENIPUNCTURE: CPT

## 2022-02-18 PROCEDURE — 28750 FUSION OF BIG TOE JOINT: CPT | Performed by: PODIATRIST

## 2022-02-18 PROCEDURE — 20680 REMOVAL OF IMPLANT DEEP: CPT | Performed by: PODIATRIST

## 2022-02-18 PROCEDURE — 77030006788 HC BLD SAW OSC STRY -B: Performed by: PODIATRIST

## 2022-02-18 PROCEDURE — 74011000250 HC RX REV CODE- 250: Performed by: ANESTHESIOLOGY

## 2022-02-18 PROCEDURE — 77030040361 HC SLV COMPR DVT MDII -B: Performed by: PODIATRIST

## 2022-02-18 PROCEDURE — 76210000063 HC OR PH I REC FIRST 0.5 HR: Performed by: PODIATRIST

## 2022-02-18 PROCEDURE — 76010000131 HC OR TIME 2 TO 2.5 HR: Performed by: PODIATRIST

## 2022-02-18 PROCEDURE — 76000 FLUOROSCOPY <1 HR PHYS/QHP: CPT

## 2022-02-18 PROCEDURE — 77030002933 HC SUT MCRYL J&J -A: Performed by: PODIATRIST

## 2022-02-18 DEVICE — LOCKING SCREW, FULLY THREADED,T8
Type: IMPLANTABLE DEVICE | Site: FOOT | Status: FUNCTIONAL
Brand: VARIAX

## 2022-02-18 DEVICE — GRAFT BNE SUB 3CC RHPDGF BB B TRICALCIUM PHSPTE RADPQ AUG: Type: IMPLANTABLE DEVICE | Site: FOOT | Status: FUNCTIONAL

## 2022-02-18 DEVICE — K-WIRE, SMOOTH
Type: IMPLANTABLE DEVICE | Site: FOOT | Status: FUNCTIONAL
Brand: VARIAX

## 2022-02-18 DEVICE — POLYAXIAL LOCKING PLATE -  MTP CROSS-PLATE, RIGHT (T8)
Type: IMPLANTABLE DEVICE | Site: FOOT | Status: FUNCTIONAL
Brand: ANCHORAGE

## 2022-02-18 DEVICE — BONE SCREW, FULLY THREADED, T8
Type: IMPLANTABLE DEVICE | Site: FOOT | Status: FUNCTIONAL
Brand: VARIAX

## 2022-02-18 DEVICE — CP LAG SCREW (T8)
Type: IMPLANTABLE DEVICE | Site: FOOT | Status: FUNCTIONAL
Brand: ANCHORAGE

## 2022-02-18 RX ORDER — EPHEDRINE SULFATE/0.9% NACL/PF 50 MG/5 ML
SYRINGE (ML) INTRAVENOUS AS NEEDED
Status: DISCONTINUED | OUTPATIENT
Start: 2022-02-18 | End: 2022-02-18 | Stop reason: HOSPADM

## 2022-02-18 RX ORDER — GLYCOPYRROLATE 0.2 MG/ML
INJECTION INTRAMUSCULAR; INTRAVENOUS AS NEEDED
Status: DISCONTINUED | OUTPATIENT
Start: 2022-02-18 | End: 2022-02-18 | Stop reason: HOSPADM

## 2022-02-18 RX ORDER — FENTANYL CITRATE 50 UG/ML
25 INJECTION, SOLUTION INTRAMUSCULAR; INTRAVENOUS
Status: DISCONTINUED | OUTPATIENT
Start: 2022-02-18 | End: 2022-02-18 | Stop reason: HOSPADM

## 2022-02-18 RX ORDER — HYDROMORPHONE HYDROCHLORIDE 1 MG/ML
0.25 INJECTION, SOLUTION INTRAMUSCULAR; INTRAVENOUS; SUBCUTANEOUS
Status: DISCONTINUED | OUTPATIENT
Start: 2022-02-18 | End: 2022-02-18 | Stop reason: HOSPADM

## 2022-02-18 RX ORDER — ONDANSETRON 2 MG/ML
INJECTION INTRAMUSCULAR; INTRAVENOUS AS NEEDED
Status: DISCONTINUED | OUTPATIENT
Start: 2022-02-18 | End: 2022-02-18 | Stop reason: HOSPADM

## 2022-02-18 RX ORDER — OXYCODONE AND ACETAMINOPHEN 5; 325 MG/1; MG/1
1 TABLET ORAL
Qty: 12 TABLET | Refills: 0 | Status: SHIPPED | OUTPATIENT
Start: 2022-02-18 | End: 2022-02-21

## 2022-02-18 RX ORDER — PROPOFOL 10 MG/ML
INJECTION, EMULSION INTRAVENOUS AS NEEDED
Status: DISCONTINUED | OUTPATIENT
Start: 2022-02-18 | End: 2022-02-18 | Stop reason: HOSPADM

## 2022-02-18 RX ORDER — ALBUTEROL SULFATE 0.83 MG/ML
2.5 SOLUTION RESPIRATORY (INHALATION) AS NEEDED
Status: DISCONTINUED | OUTPATIENT
Start: 2022-02-18 | End: 2022-02-18 | Stop reason: HOSPADM

## 2022-02-18 RX ORDER — ONDANSETRON 2 MG/ML
4 INJECTION INTRAMUSCULAR; INTRAVENOUS AS NEEDED
Status: DISCONTINUED | OUTPATIENT
Start: 2022-02-18 | End: 2022-02-18 | Stop reason: HOSPADM

## 2022-02-18 RX ORDER — SODIUM CHLORIDE 9 MG/ML
20 INJECTION, SOLUTION INTRAVENOUS CONTINUOUS
Status: DISCONTINUED | OUTPATIENT
Start: 2022-02-18 | End: 2022-02-18 | Stop reason: HOSPADM

## 2022-02-18 RX ORDER — FAMOTIDINE 10 MG/ML
INJECTION INTRAVENOUS AS NEEDED
Status: DISCONTINUED | OUTPATIENT
Start: 2022-02-18 | End: 2022-02-18 | Stop reason: HOSPADM

## 2022-02-18 RX ORDER — CEFAZOLIN SODIUM 1 G/3ML
INJECTION, POWDER, FOR SOLUTION INTRAMUSCULAR; INTRAVENOUS AS NEEDED
Status: DISCONTINUED | OUTPATIENT
Start: 2022-02-18 | End: 2022-02-18 | Stop reason: HOSPADM

## 2022-02-18 RX ORDER — HYDROCODONE BITARTRATE AND ACETAMINOPHEN 5; 325 MG/1; MG/1
1 TABLET ORAL AS NEEDED
Status: DISCONTINUED | OUTPATIENT
Start: 2022-02-18 | End: 2022-02-18 | Stop reason: HOSPADM

## 2022-02-18 RX ORDER — DEXAMETHASONE SODIUM PHOSPHATE 4 MG/ML
INJECTION, SOLUTION INTRA-ARTICULAR; INTRALESIONAL; INTRAMUSCULAR; INTRAVENOUS; SOFT TISSUE AS NEEDED
Status: DISCONTINUED | OUTPATIENT
Start: 2022-02-18 | End: 2022-02-18 | Stop reason: HOSPADM

## 2022-02-18 RX ORDER — SODIUM CHLORIDE 0.9 % (FLUSH) 0.9 %
5-40 SYRINGE (ML) INJECTION EVERY 8 HOURS
Status: DISCONTINUED | OUTPATIENT
Start: 2022-02-18 | End: 2022-02-18 | Stop reason: HOSPADM

## 2022-02-18 RX ORDER — SODIUM CHLORIDE, SODIUM LACTATE, POTASSIUM CHLORIDE, CALCIUM CHLORIDE 600; 310; 30; 20 MG/100ML; MG/100ML; MG/100ML; MG/100ML
INJECTION, SOLUTION INTRAVENOUS
Status: DISCONTINUED | OUTPATIENT
Start: 2022-02-18 | End: 2022-02-18 | Stop reason: HOSPADM

## 2022-02-18 RX ORDER — DIPHENHYDRAMINE HYDROCHLORIDE 50 MG/ML
12.5 INJECTION, SOLUTION INTRAMUSCULAR; INTRAVENOUS AS NEEDED
Status: DISCONTINUED | OUTPATIENT
Start: 2022-02-18 | End: 2022-02-18 | Stop reason: HOSPADM

## 2022-02-18 RX ORDER — SODIUM CHLORIDE 0.9 % (FLUSH) 0.9 %
5-40 SYRINGE (ML) INJECTION AS NEEDED
Status: DISCONTINUED | OUTPATIENT
Start: 2022-02-18 | End: 2022-02-18 | Stop reason: HOSPADM

## 2022-02-18 RX ORDER — LIDOCAINE HYDROCHLORIDE 10 MG/ML
INJECTION INFILTRATION; PERINEURAL AS NEEDED
Status: DISCONTINUED | OUTPATIENT
Start: 2022-02-18 | End: 2022-02-18 | Stop reason: HOSPADM

## 2022-02-18 RX ORDER — SODIUM CHLORIDE 9 MG/ML
INJECTION, SOLUTION INTRAVENOUS
Status: DISCONTINUED | OUTPATIENT
Start: 2022-02-18 | End: 2022-02-18 | Stop reason: HOSPADM

## 2022-02-18 RX ORDER — KETOROLAC TROMETHAMINE 30 MG/ML
INJECTION, SOLUTION INTRAMUSCULAR; INTRAVENOUS AS NEEDED
Status: DISCONTINUED | OUTPATIENT
Start: 2022-02-18 | End: 2022-02-18 | Stop reason: HOSPADM

## 2022-02-18 RX ORDER — MORPHINE SULFATE 10 MG/ML
INJECTION, SOLUTION INTRAMUSCULAR; INTRAVENOUS AS NEEDED
Status: DISCONTINUED | OUTPATIENT
Start: 2022-02-18 | End: 2022-02-18 | Stop reason: HOSPADM

## 2022-02-18 RX ADMIN — MORPHINE SULFATE 4 MG: 10 INJECTION, SOLUTION INTRAMUSCULAR; INTRAVENOUS at 08:15

## 2022-02-18 RX ADMIN — PHENYLEPHRINE HYDROCHLORIDE 100 MCG: 10 INJECTION INTRAVENOUS at 07:53

## 2022-02-18 RX ADMIN — PROPOFOL 60 MG: 10 INJECTION, EMULSION INTRAVENOUS at 07:53

## 2022-02-18 RX ADMIN — FAMOTIDINE 20 MG: 10 INJECTION INTRAVENOUS at 08:15

## 2022-02-18 RX ADMIN — ONDANSETRON 4 MG: 2 INJECTION INTRAMUSCULAR; INTRAVENOUS at 08:15

## 2022-02-18 RX ADMIN — MORPHINE SULFATE 2 MG: 10 INJECTION, SOLUTION INTRAMUSCULAR; INTRAVENOUS at 08:43

## 2022-02-18 RX ADMIN — DEXAMETHASONE SODIUM PHOSPHATE 4 MG: 4 INJECTION, SOLUTION INTRA-ARTICULAR; INTRALESIONAL; INTRAMUSCULAR; INTRAVENOUS; SOFT TISSUE at 08:15

## 2022-02-18 RX ADMIN — GLYCOPYRROLATE 0.2 MG: 0.2 INJECTION INTRAMUSCULAR; INTRAVENOUS at 08:30

## 2022-02-18 RX ADMIN — PROPOFOL 60 MG: 10 INJECTION, EMULSION INTRAVENOUS at 07:54

## 2022-02-18 RX ADMIN — SODIUM CHLORIDE, POTASSIUM CHLORIDE, SODIUM LACTATE AND CALCIUM CHLORIDE: 600; 310; 30; 20 INJECTION, SOLUTION INTRAVENOUS at 09:10

## 2022-02-18 RX ADMIN — Medication 1300 MCG: at 08:03

## 2022-02-18 RX ADMIN — PHENYLEPHRINE HYDROCHLORIDE 200 MCG: 10 INJECTION INTRAVENOUS at 09:08

## 2022-02-18 RX ADMIN — CEFAZOLIN SODIUM 1 G: 1 INJECTION, POWDER, FOR SOLUTION INTRAMUSCULAR; INTRAVENOUS at 08:00

## 2022-02-18 RX ADMIN — KETOROLAC TROMETHAMINE 15 MG: 30 INJECTION, SOLUTION INTRAMUSCULAR at 08:59

## 2022-02-18 RX ADMIN — SODIUM CHLORIDE 20 ML/HR: 9 INJECTION, SOLUTION INTRAVENOUS at 06:41

## 2022-02-18 RX ADMIN — PHENYLEPHRINE HYDROCHLORIDE 200 MCG: 10 INJECTION INTRAVENOUS at 09:16

## 2022-02-18 RX ADMIN — PROPOFOL 30 MG: 10 INJECTION, EMULSION INTRAVENOUS at 07:55

## 2022-02-18 RX ADMIN — SODIUM CHLORIDE: 9 INJECTION, SOLUTION INTRAVENOUS at 07:36

## 2022-02-18 RX ADMIN — PHENYLEPHRINE HYDROCHLORIDE 200 MCG: 10 INJECTION INTRAVENOUS at 08:13

## 2022-02-18 NOTE — H&P
Patient: Mony Monreal MRN: [id number]  SSN: [id number]    YOB: 1934  Age: 80 y.o. Sex: female      History and Physical    Mony Monreal is a 80 y.o. female having Procedure(s):  RIGHT FOOT REMOVAL ORTHOPEDIC HARDWARE/FIRST METATARSOPHALANGEAL JOINT ARTHRODESIS/HAMMERTOE CORRECTION 2ND DIGIT/2ND METATARSAL HEAD RESECTION. Allergies: Allergies   Allergen Reactions    Prednisone Rash and Itching     Pt stated does not have any allergies and has taken prednisone with no reaction. Chief Complaint: right foot pain    History of Present Illness:  80 y.o. F here with right foot pain. Past Medical History:   Diagnosis Date    Arthritis     Chronic kidney disease     pt unaware of stage; GFR 30 per pateint    Hx of colonoscopy with polypectomy     benign    Hypertension     Mitral valve regurgitation 02/14/2011    mild per Dr. Lani Dallas; pt stated leaky valve    Other ill-defined conditions(799.89)     cataracts    Other ill-defined conditions(799.89) 1-1-03    fx left ankle-boot only    Pneumonia ~ 7 yrs ago    Varicose veins     left leg worse than right currently      Past Surgical History:   Procedure Laterality Date    HX APPENDECTOMY      HX CATARACT REMOVAL      HX KNEE ARTHROSCOPY  8-29-07    right    HX OPEN CHOLECYSTECTOMY  1962    HX ORTHOPAEDIC  4-12-96    shahid. kelly's neuroma    HX ORTHOPAEDIC      right foot bunionectomy    HX ORTHOPAEDIC  7-11-01    removal of left foot heel spur    HX TONSILLECTOMY      HX TUBAL LIGATION      OR BREAST SURGERY PROCEDURE UNLISTED  1986    excision calcium deposit left breast    OR TOTAL KNEE ARTHROPLASTY  1-14-08    left    VASCULAR SURGERY PROCEDURE UNLIST  1979    left leg vein stripping      History reviewed. No pertinent family history.    Social History     Tobacco Use    Smoking status: Never Smoker    Smokeless tobacco: Never Used   Substance Use Topics    Alcohol use: No        Prior to Admission medications    Medication Sig Start Date End Date Taking? Authorizing Provider   ascorbic acid, vitamin C, (Vitamin C) 500 mg tablet Take 500 mg by mouth daily. Yes Provider, Historical   cholecalciferol (Vitamin D3) (1000 Units /25 mcg) tablet Take 1,000 Units by mouth daily. Yes Provider, Historical   cinnamon bark (Cinnamon) 500 mg cap Take  by mouth daily. Yes Provider, Historical   spironolactone (ALDACTONE) 100 mg tablet Take 25 mg by mouth daily. Yes Provider, Historical   amLODIPine (NORVASC) 10 mg tablet Take  by mouth daily. Yes Provider, Historical   furosemide (LASIX) 20 mg tablet Take  by mouth daily. Yes Provider, Historical   potassium chloride (K-DUR, KLOR-CON) 20 mEq tablet Take  by mouth two (2) times a day. Yes Provider, Historical        Visit Vitals  BP (!) 145/68 (BP 1 Location: Left upper arm, BP Patient Position: At rest)   Pulse 87   Temp 36.7 °C (98.1 °F)   Resp 18   Ht 5' 4\" (1.626 m)   Wt 66.7 kg (147 lb)   SpO2 99%   BMI 25.23 kg/m²        Assessment and Plan:   Mariama Wolf is a 80 y.o. female having Procedure(s):  RIGHT FOOT REMOVAL ORTHOPEDIC HARDWARE/FIRST METATARSOPHALANGEAL JOINT ARTHRODESIS/HAMMERTOE CORRECTION 2ND DIGIT/2ND METATARSAL HEAD RESECTION.     Preanesthesia Evaluation     Last edited 02/18/22 7580 by David Zabala MD  Date of Service 02/18/22 9304  Status: Addendum             Relevant Problems   No relevant active problems       Anesthetic History   No history of anesthetic complications            Review of Systems / Medical History  Patient summary reviewed, nursing notes reviewed and pertinent labs reviewed    Pulmonary  Within defined limits                 Neuro/Psych   Within defined limits           Cardiovascular    Hypertension              Exercise tolerance: <4 METS     GI/Hepatic/Renal         Renal disease: CRI       Endo/Other        Arthritis     Other Findings          Past Medical History:   Diagnosis Date    Arthritis  Chronic kidney disease     pt unaware of stage; GFR 30 per pateint    Hx of colonoscopy with polypectomy     benign    Hypertension     Mitral valve regurgitation 02/14/2011    mild per Dr. Cecy Vargas; pt stated leaky valve    Other ill-defined conditions(799.89)     cataracts    Other ill-defined conditions(799.89) 1-1-03    fx left ankle-boot only    Pneumonia ~ 7 yrs ago    Varicose veins     left leg worse than right currently       Past Surgical History:   Procedure Laterality Date    HX APPENDECTOMY      HX CATARACT REMOVAL      HX KNEE ARTHROSCOPY  8-29-07    right    HX OPEN CHOLECYSTECTOMY  1962    HX ORTHOPAEDIC  4-12-96    shahid. kelly's neuroma    HX ORTHOPAEDIC      right foot bunionectomy    HX ORTHOPAEDIC  7-11-01    removal of left foot heel spur    HX TONSILLECTOMY      HX TUBAL LIGATION      TX BREAST SURGERY PROCEDURE UNLISTED  1986    excision calcium deposit left breast    TX TOTAL KNEE ARTHROPLASTY  1-14-08    left    VASCULAR SURGERY PROCEDURE UNLIST  1979    left leg vein stripping       Current Outpatient Medications   Medication Instructions    amLODIPine (NORVASC) 10 mg tablet Oral, DAILY    ascorbic acid (vitamin C) (VITAMIN C) 500 mg, Oral, DAILY    cholecalciferol (VITAMIN D3) 1,000 Units, Oral, DAILY    cinnamon bark (Cinnamon) 500 mg cap Oral, DAILY    furosemide (LASIX) 20 mg tablet Oral, DAILY    potassium chloride (K-DUR, KLOR-CON) 20 mEq tablet Oral, 2 TIMES DAILY    spironolactone (ALDACTONE) 25 mg, Oral, DAILY       Current Facility-Administered Medications   Medication Dose Route Frequency    0.9% sodium chloride infusion  20 mL/hr IntraVENous CONTINUOUS    ceFAZolin (ANCEF) 1 g in sterile water (preservative free) 10 mL IV syringe  1 g IntraVENous ONCE       Patient Vitals for the past 24 hrs:   Temp Pulse Resp BP SpO2   02/18/22 0631 36.7 °C (98.1 °F) 87 18 (!) 145/68 99 %       Lab Results   Component Value Date/Time    WBC 5.0 02/01/2012 09:50 AM    HGB 13.0 02/01/2012 09:50 AM    HCT 38.3 02/01/2012 09:50 AM    PLATELET 129 48/84/5187 09:50 AM    MCV 93.0 02/01/2012 09:50 AM     Lab Results   Component Value Date/Time    Sodium 140 02/01/2012 09:50 AM    Potassium 4.2 02/01/2012 09:50 AM    Chloride 99 02/01/2012 09:50 AM    CO2 31 02/01/2012 09:50 AM    Anion gap 10 02/01/2012 09:50 AM    Glucose 112 (H) 02/01/2012 09:50 AM    BUN 24 (H) 02/01/2012 09:50 AM    Creatinine 1.0 02/01/2012 09:50 AM    BUN/Creatinine ratio 24 (H) 02/01/2012 09:50 AM    GFR est AA >60 02/01/2012 09:50 AM    GFR est non-AA 57 (L) 02/01/2012 09:50 AM    Calcium 9.9 02/01/2012 09:50 AM     No results found for: APTT, PTP, INR, INREXT  Lab Results   Component Value Date/Time    Glucose 112 (H) 02/01/2012 09:50 AM     Physical Exam    Airway  Mallampati: I  TM Distance: 4 - 6 cm  Neck ROM: normal range of motion   Mouth opening: Normal     Cardiovascular    Rhythm: regular  Rate: normal         Dental    Dentition: Edentulous     Pulmonary  Breath sounds clear to auscultation               Abdominal  GI exam deferred       Other Findings            Anesthetic Plan    ASA: 3  Anesthesia type: general          Induction: Intravenous  Anesthetic plan and risks discussed with: Patient and Family               Preanesthesia evaluation performed by Génesis Desouza MD    Revision History       Date/Time User Provider Type Action    > 02/18/22 0727 Génesis Desouza MD Physician Addend     02/18/22 Zuly Marrero MD Physician Sign                  Signed By: Carlos Panda MD     February 18, 2022

## 2022-02-18 NOTE — DISCHARGE INSTRUCTIONS
Patient Education        Metatarsal Phalangeal Joint Fusion: What to Expect at [address] [place] [address] Drive had surgery on your foot to remove the joint at the base of your toe. After your surgery, your foot may be red and swollen. Pain and swelling should slowly improve over the next 6 weeks. You may not be able to put weight on the foot during those 6 weeks. You may have some minor pain and swelling that lasts as long as 6 months to a year. After surgery, you may need to wear a special type of shoe or boot for 3 to 6 weeks. It will help protect your foot and keep your bones in the right position. Your doctor will remove your stitches about 2 weeks after the surgery. Follow your doctor's instructions for putting weight on your foot. And follow any other special directions your doctor gives you. This care sheet gives you a general idea about how long it will take for you to recover. But each person recovers at a different pace. Follow the steps below to get better as quickly as possible. How can you care for yourself at home? Activity    · Rest when you feel tired.     · You may shower, unless your doctor tells you not to. Keep the bandage dry. If the bandage has been removed, you can wash the area with warm water and soap. Pat the area dry.     · Many people are able to return to work within several weeks after surgery.     · You may need to avoid heavy lifting for 3 to 8 weeks or longer.     · You may need to do regular rehabilitation (rehab) exercises to strengthen your foot and improve movement. Start out slowly, and follow your doctor's instructions. Diet    · You can eat your normal diet. If your stomach is upset, try bland, low-fat foods like plain rice, broiled chicken, toast, and yogurt.     · If your bowel movements are not regular right after surgery, try to avoid constipation and straining. Drink plenty of water. Your doctor may suggest fiber, a stool softener, or a mild laxative.    Medicines    · Your doctor will tell you if and when you can restart your medicines. He or she will also give you instructions about taking any new medicines.     · If you take aspirin or some other blood thinner, be sure to talk to your doctor. He or she will tell you if and when to start taking this medicine again. Make sure that you understand exactly what your doctor wants you to do.     · Be safe with medicines. Read and follow all instructions on the label. ? If the doctor gave you a prescription medicine for pain, take it as prescribed. ? If you are not taking a prescription pain medicine, ask your doctor if you can take an over-the-counter medicine. Incision care    · You will leave the hospital with bandages around your foot. Your doctor will probably remove the bandages after several days. Or your doctor may have you remove your bandages at home. Do not touch the surgery area. Keep it dry.     · Do not soak your foot until your doctor says it is okay. Ice and elevation    · For pain and swelling, put ice or a cold pack on your foot for 10 to 20 minutes each hour. Put a thin cloth between the ice and your skin.     · Prop up your foot and leg on a pillow when you ice it or anytime you sit or lie down during the next 3 days. Try to keep it above the level of your heart. This will help reduce swelling. Follow-up care is a key part of your treatment and safety. Be sure to make and go to all appointments, and call your doctor if you are having problems. It's also a good idea to know your test results and keep a list of the medicines you take. When should you call for help? Call 911  anytime you think you may need emergency care. For example, call if:    · You passed out (lost consciousness).     · You have sudden chest pain and shortness of breath, or you cough up blood.     · You have severe trouble breathing.    Call your doctor now or seek immediate medical care if:    · Your foot or toes are cool or pale or change color.     · You have numbness, tingling, or less feeling in your foot or toe.     · You have pain that does not get better after you take pain medicine.     · You have loose stitches, or your incision comes open.     · Bright red blood has soaked through the bandage over your incision.     · You have symptoms of infection, such as:  ? Increased pain, swelling, warmth, or redness. ? Red streaks leading from the area. ? Pus draining from the area. ? A fever. Watch closely for any changes in your health, and be sure to contact your doctor if:    · You do not have a bowel movement after taking a laxative. Where can you learn more? Go to http://www.gray.com/  Enter M155 in the search box to learn more about \"Metatarsal Phalangeal Joint Fusion: What to Expect at Home. \"  Current as of: July 1, 2021               Content Version: 13.0  © 9241-4919 Healthwise, [facility]. Care instructions adapted under license by [employer] (which disclaims liability or warranty for this information). If you have questions about a medical condition or this instruction, always ask your healthcare professional. [place] [address] any warranty or liability for your use of this information.

## 2022-02-18 NOTE — PROGRESS NOTES
Patient discharged home/self care. IV removed. Vital signs stable on room air. Discharge instructions reviewed with patient, patient verbalizes understanding. Patient stated she has a walker and crutches at home to use. Patient wheeled down to front hospital entrance with  present to transport patient home.

## 2022-02-18 NOTE — PROGRESS NOTES
[facility]          Pt seen in  Pre op holding  - All questions answered and concerns addressed  - Ancef 1g ordered for surgical prophylaxis  - NPO since midnight  - Consent signed/witnessed  - Pt cleared by her PCP and the anesthesia team here at [facility]  - I will follow closely as outpatient    Thank you! Justin A. Albertina Hammans, [facility], [address] [place] [address] and [address]  O: [phone]  F: [phone]  C: [phone]

## 2022-02-18 NOTE — PERIOP NOTES
TRANSFER - OUT REPORT:    Verbal report given to [facility], RN(name) on Gloria James  being transferred to [place] [address](unit) for routine post - op       Report consisted of patients Situation, Background, Assessment and   Recommendations(SBAR). Information from the following report(s) SBAR, Procedure Summary, Intake/Output and MAR was reviewed with the receiving nurse. Opportunity for questions and clarification was provided.       Patient transported with:   Registered Nurse

## 2022-02-18 NOTE — ANESTHESIA POSTPROCEDURE EVALUATION
Procedure(s):  RIGHT FOOT REMOVAL ORTHOPEDIC HARDWARE/FIRST METATARSOPHALANGEAL JOINT ARTHRODESIS/HAMMERTOE CORRECTION 2ND DIGIT/2ND METATARSAL HEAD RESECTION.     general    Anesthesia Post Evaluation      Multimodal analgesia: multimodal analgesia used between 6 hours prior to anesthesia start to PACU discharge  Patient location during evaluation: bedside  Patient participation: complete - patient participated  Level of consciousness: awake and alert  Pain score: 0  Pain management: adequate  Airway patency: patent  Anesthetic complications: no  Cardiovascular status: acceptable, blood pressure returned to baseline and hemodynamically stable  Respiratory status: acceptable, nasal cannula, spontaneous ventilation and nonlabored ventilation  Hydration status: acceptable  Post anesthesia nausea and vomiting:  none  Final Post Anesthesia Temperature Assessment:  Normothermia (36.0-37.5 degrees C)      INITIAL Post-op Vital signs:   Vitals Value Taken Time   /60 02/18/22 0956   Temp 37.1 °C (98.7 °F) 02/18/22 0956   Pulse 94 02/18/22 0956   Resp 21 02/18/22 0956   SpO2 98 % 02/18/22 0956

## 2022-02-18 NOTE — ANESTHESIA PREPROCEDURE EVALUATION
Relevant Problems   No relevant active problems       Anesthetic History   No history of anesthetic complications            Review of Systems / Medical History  Patient summary reviewed, nursing notes reviewed and pertinent labs reviewed    Pulmonary  Within defined limits                 Neuro/Psych   Within defined limits           Cardiovascular    Hypertension              Exercise tolerance: <4 METS     GI/Hepatic/Renal         Renal disease: CRI       Endo/Other        Arthritis     Other Findings          Past Medical History:   Diagnosis Date    Arthritis     Chronic kidney disease     pt unaware of stage; GFR 30 per pateint    Hx of colonoscopy with polypectomy     benign    Hypertension     Mitral valve regurgitation 02/14/2011    mild per Dr. Blanton Score; pt stated leaky valve    Other ill-defined conditions(799.89)     cataracts    Other ill-defined conditions(799.89) 1-1-03    fx left ankle-boot only    Pneumonia ~ 7 yrs ago    Varicose veins     left leg worse than right currently       Past Surgical History:   Procedure Laterality Date    HX APPENDECTOMY      HX CATARACT REMOVAL      HX KNEE ARTHROSCOPY  8-29-07    right    HX OPEN CHOLECYSTECTOMY  1962    HX ORTHOPAEDIC  4-12-96    shahid. kelly's neuroma    HX ORTHOPAEDIC      right foot bunionectomy    HX ORTHOPAEDIC  7-11-01    removal of left foot heel spur    HX TONSILLECTOMY      HX TUBAL LIGATION      CA BREAST SURGERY PROCEDURE UNLISTED  1986    excision calcium deposit left breast    CA TOTAL KNEE ARTHROPLASTY  1-14-08    left    VASCULAR SURGERY PROCEDURE UNLIST  1979    left leg vein stripping       Current Outpatient Medications   Medication Instructions    amLODIPine (NORVASC) 10 mg tablet Oral, DAILY    ascorbic acid (vitamin C) (VITAMIN C) 500 mg, Oral, DAILY    cholecalciferol (VITAMIN D3) 1,000 Units, Oral, DAILY    cinnamon bark (Cinnamon) 500 mg cap Oral, DAILY    furosemide (LASIX) 20 mg tablet Oral, DAILY    potassium chloride (K-DUR, KLOR-CON) 20 mEq tablet Oral, 2 TIMES DAILY    spironolactone (ALDACTONE) 25 mg, Oral, DAILY       Current Facility-Administered Medications   Medication Dose Route Frequency    0.9% sodium chloride infusion  20 mL/hr IntraVENous CONTINUOUS    ceFAZolin (ANCEF) 1 g in sterile water (preservative free) 10 mL IV syringe  1 g IntraVENous ONCE       Patient Vitals for the past 24 hrs:   Temp Pulse Resp BP SpO2   02/18/22 0631 36.7 °C (98.1 °F) 87 18 (!) 145/68 99 %       Lab Results   Component Value Date/Time    WBC 5.0 02/01/2012 09:50 AM    HGB 13.0 02/01/2012 09:50 AM    HCT 38.3 02/01/2012 09:50 AM    PLATELET 279 04/47/3735 09:50 AM    MCV 93.0 02/01/2012 09:50 AM     Lab Results   Component Value Date/Time    Sodium 140 02/01/2012 09:50 AM    Potassium 4.2 02/01/2012 09:50 AM    Chloride 99 02/01/2012 09:50 AM    CO2 31 02/01/2012 09:50 AM    Anion gap 10 02/01/2012 09:50 AM    Glucose 112 (H) 02/01/2012 09:50 AM    BUN 24 (H) 02/01/2012 09:50 AM    Creatinine 1.0 02/01/2012 09:50 AM    BUN/Creatinine ratio 24 (H) 02/01/2012 09:50 AM    GFR est AA >60 02/01/2012 09:50 AM    GFR est non-AA 57 (L) 02/01/2012 09:50 AM    Calcium 9.9 02/01/2012 09:50 AM     No results found for: APTT, PTP, INR, INREXT  Lab Results   Component Value Date/Time    Glucose 112 (H) 02/01/2012 09:50 AM     Physical Exam    Airway  Mallampati: I  TM Distance: 4 - 6 cm  Neck ROM: normal range of motion   Mouth opening: Normal     Cardiovascular    Rhythm: regular  Rate: normal         Dental    Dentition: Edentulous     Pulmonary  Breath sounds clear to auscultation               Abdominal  GI exam deferred       Other Findings            Anesthetic Plan    ASA: 3  Anesthesia type: general          Induction: Intravenous  Anesthetic plan and risks discussed with: Patient and Family

## 2022-02-18 NOTE — OP NOTES
Operative Report    Patient: Melvyn Harada MRN: [id number]  SSN: [id number]    YOB: 1934  Age: 80 y.o. Sex: female       Date of Surgery:   02/18/2022     Preoperative Diagnosis:   PAIN DUE TO INTERNAL ORTHOPEDIC DEVICES RIGHT FOOT  HAV RIGHT FOOT   HAMMERTOE RIGHT SECOND DIGIT  HYPERTROPHY OF BONE RIGHT FOOT    Postoperative Diagnosis:   Same    Surgeon(s) and Role:     * Marsha Wolf DPM - Primary    Assistant(s):  SEAMUS Lantigua    Anesthesia:   General with local, consisting of 10cc of 1% lidocaine plain    Procedure: Procedure(s):  1. REMOVAL ORTHOPEDIC HARDWARE RIGHT FOOT   2. FIRST METATARSOPHALANGEAL JOINT ARTHRODESIS RIGHT FOOT   3. HAMMERTOE CORRECTION SECOND DIGIT RIGHT FOOT   4. SECOND METATARSAL HEAD RESECTION RIGHT FOOT   5. FLUOROSCOPIC EXAM RIGHT FOOT     Procedure in Detail:   Pt was seen in the pre-operative holding area and all questions were answered and all concerns were addressed. The operative procedure was discussed in great detail, with all possible complications highlighted. Pt verbalized complete understanding and the consent was signed and witnessed. Pt was given ancef 1g for surgical prophylaxis. The operative limb was marked and the pt was transported to the operating room. The pt was transferred to the operating table and anesthesia was administered as indicated above. The right foot was scrubbed and draped in sterile fashion. A time out was performed to confirm the correct pt, correct procedure, correct limb, abx, allergies, fire risk and attendees within the operating room. The tourniquet was inflated to the right ankle at 250mmHg. Upon completion, procedure #1 commenced. Procedure #1: REMOVAL ORTHOPEDIC HARDWARE RIGHT FOOT   With a 15 blade, a linear incision was made overlying the first metatarsal head and base of the first proximal phalanx. Blunt dissection was performed and care was taken to avoid vital neurovascular structures.   The periosteum and joint capsule were sharply incised. The noted deep orthopedic hardware was identified and removed without incident from the head of the first metatarsal and base of the first proximal phalanx. The sites were flushed with normal saline    Procedure #2: FIRST METATARSOPHALANGEAL JOINT ARTHRODESIS RIGHT FOOT  The previous incision was then extended both proximally and distally. Additional dissection was performed to further expose the first metatarsal and proximal phalanx. With a sagittal saw, the medial eminence was removed from the head of the first metatarsal.  The cartilage was then removed from the first metatarsal phalangeal joint with a rongeur. A K wire was used to subchondral drill and fenestrate the base of the proximal phalanx and head of the metatarsal.  At this time, Velinda Borja medical augment bone graft was injected into the joint area to facilitate joint fusion postop. A K wire was utilized as temporary fixation and verified utilizing intraoperative fluoroscopy. In stepwise fashion, the compression plating system from [employer] was utilized (1 compression screw and 4 screws through the plate). Throughout the procedure position was verified with intraoperative fluoroscopy. A very light flushed with normal saline was performed after final placement of the plate was verified with intraoperative fluoroscopy    Procedure #3: HAMMERTOE CORRECTION SECOND DIGIT RIGHT FOOT   Attention was then directed to the distal interphalangeal joint of the right second toe. With a fresh 15 blade a linear incision was made and dissection was taken down to the level of the joint, taking care to avoid vital neurovascular structures. With a bone cutter, the head of the middle phalanx was removed and the digit was mobilized into correct position. A flush was performed with normal saline.     Procedure #4: SECOND METATARSAL HEAD RESECTION RIGHT FOOT   The incision from procedure #3 it was then extended proximally and taken down to the level of the second metatarsophalangeal joint. The head of the second metatarsal was exposed and removed with a sagittal saw. The digit was then mobilized into correct position. A flush was performed with normal saline. Again, utilizing intraoperative fluoroscopy, a K wire was driven from the distal tip of the second toe through all phalanges and into the second metatarsal.  A flush was performed with normal saline    Procedure #5: FLUOROSCOPIC EXAM RIGHT FOOT   As stated above, intra op fluoroscopy was utilized throughout the case the check for alignment of the bones and placement of orthopedic hardware. Upon completion of the above procedure, a final fluoroscopic exam was performed of the right foot. Attention was then directed to closing each surgical incision. The deep structures were coapted with 3-0 Monocryl and the skin was reapproximated with 3-0 nylon. The tourniquet was released and noted to be inflated for a total of 80 minutes. The surgical incisions were covered with Betadine ointment, 4 x 4's, Kerlix and a well-padded posterior splint    Pt tolerated the above procedures well and all post operative counts were correct. Pt was transferred to PACU without incident. A thorough neurovascular check was then performed. Upon meeting discharge criteria, pt was discharged home to self-care. She is instructed to remain strict nonweightbearing to the right lower extremity. She is to elevate above the level of her heart and apply ice behind her right knee for swelling and pain reduction. She is to keep the splint clean/dry/intact. She was given a prescription for Percocet 2842 for oral analgesia.   She is to follow-up in the office in 2 weeks for surgical site check and possible suture removal    Estimated Blood Loss:    5cc    Tourniquet Time:   Total Tourniquet Time Documented:  Calf (Right) - 80 minutes  Total: Calf (Right) - 80 minutes      Implants:   Implant Name Type Inv.  Item Serial No.  Lot No. LRB No. Used Action   GRAFT BNE SUB 3CC RHPDGF BB B TRICALCIUM PHSPTE RADPQ AUG - SNA    GRAFT BNE SUB 3CC RHPDGF BB B TRICALCIUM PHSPTE RADPQ AUG NA   [employer] [id number] Right 1 Implanted   PLATE ANCHORAGE 2 MTP CP RT - SNA   PLATE ANCHORAGE 2 MTP CP RT NA  [employer] HOW_ NA  Right 1 Implanted   SCR BNE LCK T8 FT 2.7X14MM -- VARIAX - SNA   SCR BNE LCK T8 FT 2.7X14MM -- [employer]IAX NA  [employer] HOW_ NA Right 2 Implanted   SCR BNE LCK FT 2.7X12MM -- VARIAX - SNA  SCR BNE LCK FT 2.7X12MM -- VARIAX NA [employer] HOW_ NA Right 1 Implanted   STAPLE SPNL SINGLE 15 MM CD HORZ - SNA  STAPLE SPNL SINGLE 15 MM CD HORZ NA [employer] [place] [employer]_ NA Right 1 Implanted   SCR BNE FT 2.7X10MM T8 -- VARIAX - SNA  SCR BNE FT 2.7X10MM T8 -- VARIAX NA [employer] HOW_ NA Right 1 Explanted   SCREW BNE L22MM [id number] CHAI LAG PARTIALLY THRD CROSSPIN T8 - SNA  SCREW BNE L22MM [id number] CHAI LAG PARTIALLY THRD CROSSPIN T8 NA [employer]_ NA Right 1 Explanted   SCR CP LAG 3.6X24MM -- ANCHORAGE - SNA  SCR CP LAG 3.6X24MM -- ANCHORAGE NA [employer] HOW_ NA Right 1 Implanted      Specimens:   None        Drains:   None                Complications:   None      Signed By:  Linda Dhillon DPM     February 18, 2022

## 2022-02-23 ENCOUNTER — TELEPHONE (OUTPATIENT)
Dept: PODIATRY | Age: 87
End: 2022-02-23

## 2022-03-03 ENCOUNTER — OFFICE VISIT (OUTPATIENT)
Dept: PODIATRY | Age: 87
End: 2022-03-03
Payer: MEDICARE

## 2022-03-03 VITALS
HEIGHT: 64 IN | SYSTOLIC BLOOD PRESSURE: 169 MMHG | HEART RATE: 94 BPM | DIASTOLIC BLOOD PRESSURE: 106 MMHG | TEMPERATURE: 97.5 F | BODY MASS INDEX: 25.23 KG/M2

## 2022-03-03 DIAGNOSIS — Z97.8 ORTHOPEDIC HARDWARE PRESENT: Primary | ICD-10-CM

## 2022-03-03 PROCEDURE — 99024 POSTOP FOLLOW-UP VISIT: CPT | Performed by: PODIATRIST

## 2022-03-03 NOTE — PROGRESS NOTES
1. Have you been to the ER, urgent care clinic since your last visit? Hospitalized since your last visit? No    2. Have you seen or consulted any other health care providers outside of the [address] [place] [address] since your last visit? Include any pap smears or colon screening.  No     Chief Complaint   Patient presents with    Post OP Follow Up     2 wk post-op R foot surgical site

## 2022-03-22 ENCOUNTER — HOSPITAL ENCOUNTER (OUTPATIENT)
Dept: GENERAL RADIOLOGY | Age: 87
Discharge: HOME | End: 2022-03-22
Payer: MEDICARE

## 2022-03-22 DIAGNOSIS — Z97.8 ORTHOPEDIC HARDWARE PRESENT: ICD-10-CM

## 2022-03-22 PROCEDURE — 73630 X-RAY EXAM OF FOOT: CPT

## 2022-03-22 NOTE — PROGRESS NOTES
[facility]      HPI:  Patient is being seen for first postoperative visit status post first MTPJ fusion, second hammertoe correction and second metatarsal head excision. All of the aforementioned procedures were performed to the right lower extremity. The patient has required a complete dressing and splint change since the procedure due to her walking and saturating the dressing and splint with sanguinous drainage. Patient states she has had no pain since the procedure. She is applying ice behind the right knee. Is the complete dressing and splint change, she has kept the right lower extremity clean/dry/intact. She states she is now using crutches for protected ambulation. She denies any local/systemic signs infection. She denies any other pedal complaints       ROS:  A complete review of systems is unremarkable outside of HPI       PE:  Upon removing the dressing/splint, intense edema noted. No signs of wound dehiscence or infection noted. K wire protruding from the right second toe       ASSESSMENT:  S/p right first MTPJ fusion, second hammertoe correction and second metatarsal head excision (DOS: 02/18/2022)       PLAN:  Splint/dressing removed to the right lower extremity. Sutures removed without incident. An order was given for x-rays to be performed to evaluate the surgical sites due to her subjective history of weightbearing  She is to continue with oral anti-inflammatories as needed        Casper Medellin, [facility], [address] [place] [address] and [facility]  [address] [place] [address], [address] [place] [address]  O: [phone]  F: [phone]  C: [phone]

## 2022-03-31 ENCOUNTER — OFFICE VISIT (OUTPATIENT)
Dept: PODIATRY | Age: 87
End: 2022-03-31
Payer: MEDICARE

## 2022-03-31 VITALS
DIASTOLIC BLOOD PRESSURE: 68 MMHG | HEART RATE: 79 BPM | SYSTOLIC BLOOD PRESSURE: 147 MMHG | TEMPERATURE: 96.9 F | BODY MASS INDEX: 25.23 KG/M2 | HEIGHT: 64 IN

## 2022-03-31 DIAGNOSIS — Z98.890 POST-OPERATIVE STATE: Primary | ICD-10-CM

## 2022-03-31 PROCEDURE — 99024 POSTOP FOLLOW-UP VISIT: CPT | Performed by: PODIATRIST

## 2022-03-31 NOTE — PROGRESS NOTES
1. Have you been to the ER, urgent care clinic since your last visit? Hospitalized since your last visit? No    2. Have you seen or consulted any other health care providers outside of the [address] [place] [address] since your last visit? Include any pap smears or colon screening.  No     Chief Complaint   Patient presents with    Post OP Follow Up     recheck R foot surgical site

## 2022-04-28 ENCOUNTER — OFFICE VISIT (OUTPATIENT)
Dept: PODIATRY | Age: 87
End: 2022-04-28
Payer: MEDICARE

## 2022-04-28 VITALS
HEIGHT: 64 IN | TEMPERATURE: 96.9 F | DIASTOLIC BLOOD PRESSURE: 58 MMHG | OXYGEN SATURATION: 99 % | SYSTOLIC BLOOD PRESSURE: 134 MMHG | HEART RATE: 80 BPM | BODY MASS INDEX: 25.23 KG/M2

## 2022-04-28 DIAGNOSIS — Z98.890 POST-OPERATIVE STATE: Primary | ICD-10-CM

## 2022-04-28 PROCEDURE — 99024 POSTOP FOLLOW-UP VISIT: CPT | Performed by: PODIATRIST

## 2022-04-28 NOTE — PROGRESS NOTES
[facility]      HPI:  Patient is being seen for second postoperative visit status post first MTPJ fusion, second hammertoe correction and second metatarsal head excision. All of the aforementioned procedures were performed to the right lower extremity. Patient has been walking in her post op splint to perform house chores (against medical advice). Patient states she has had no pain since her last office visit. She is applying ice behind the right knee. She has kept the right lower extremity clean/dry/intact. She states she is using crutches for protected ambulation. She denies any local/systemic signs infection. She denies any other pedal complaints       ROS:  A complete review of systems is unremarkable outside of HPI       PE:  Upon removing the dressing/splint, mild edema noted. No signs of wound dehiscence or infection noted. K wire protruding from the right second toe       ASSESSMENT:  S/p right first MTPJ fusion, second hammertoe correction and second metatarsal head excision (DOS: 02/18/2022)       PLAN:  Splint/dressing removed to the right lower extremity. K wire removed from the right second toe without incident. New dressing applied and patient given a walking boot for protected ambulation. She is to continue with the walking boot for period of 2 weeks. She is to continue with oral anti-inflammatories as needed        Casper Galdamez, [address] [facility], [address] [place] [address] and [facility]  [address] [place] [address] 357, [address] [place] [address], [address] [place] [address]  O: [phone]  F: [phone]  C: [phone]

## 2022-04-28 NOTE — PROGRESS NOTES
[facility]      HPI:  Patient is being seen for third postoperative visit status post first MTPJ fusion, second hammertoe correction and second metatarsal head excision. All of the aforementioned procedures were performed to the right lower extremity. Patient has been walking in her walking boot for protected ambulation. Patient states she has had no pain since her last office visit. She is applying ice behind the right knee. She denies any local/systemic signs infection. She denies any other pedal complaints       ROS:  A complete review of systems is unremarkable outside of HPI       PE:  Healed surgical sites noted to the right foot with no signs of infection or dehiscence       ASSESSMENT:  S/p right first MTPJ fusion, second hammertoe correction and second metatarsal head excision (DOS: 02/18/2022)       PLAN:  Surgical sites evaluated. Patient may transition to a sneaker and she is cleared from surgical protocol  She is to continue with oral anti-inflammatories as needed        Casper Méndez, [facility], [address] [place] [address] and [facility]  [address] [place] [address]  O: [phone]  F: [phone]  C: [phone]

## 2022-06-14 ENCOUNTER — OFFICE VISIT (OUTPATIENT)
Dept: PODIATRY | Age: 87
End: 2022-06-14
Payer: MEDICARE

## 2022-06-14 VITALS
BODY MASS INDEX: 23.05 KG/M2 | RESPIRATION RATE: 16 BRPM | TEMPERATURE: 97.7 F | HEART RATE: 72 BPM | SYSTOLIC BLOOD PRESSURE: 146 MMHG | OXYGEN SATURATION: 98 % | HEIGHT: 64 IN | DIASTOLIC BLOOD PRESSURE: 66 MMHG | WEIGHT: 135 LBS

## 2022-06-14 DIAGNOSIS — Z98.890 POST-OPERATIVE STATE: Primary | ICD-10-CM

## 2022-06-14 PROCEDURE — 99024 POSTOP FOLLOW-UP VISIT: CPT | Performed by: PODIATRIST

## 2022-06-14 NOTE — PROGRESS NOTES
1. \"Have you been to the ER, urgent care clinic since your last visit? Hospitalized since your last visit? \" No    2. \"Have you seen or consulted any other health care providers outside of the [address] [place] [address] since your last visit? \" No     3. For patients aged 39-70: Has the patient had a colonoscopy / FIT/ Cologuard? NA - based on age      If the patient is female:    4. For patients aged 41-77: Has the patient had a mammogram within the past 2 years? NA - based on age or sex      11. For patients aged 21-65: Has the patient had a pap smear?  NA - based on age or sex     Chief Complaint   Patient presents with    Wound Check       Visit Vitals  BP (!) 146/66 (BP 1 Location: Left upper arm, BP Patient Position: Sitting, BP Cuff Size: Adult)   Pulse 72   Temp 97.7 °F (36.5 °C) (Temporal)   Resp 16   Ht 5' 4\" (1.626 m)   Wt 135 lb (61.2 kg)   SpO2 98%   BMI 23.17 kg/m²

## 2022-07-14 NOTE — PROGRESS NOTES
[facility]      HPI:  Patient is being seen for fourth postoperative visit status post first MTPJ fusion, second hammertoe correction and second metatarsal head excision. All of the aforementioned procedures were performed to the right lower extremity. Patient has been walking in her walking boot for protected ambulation. Patient states she has had no pain since her last office visit. She is applying ice behind the right knee. She denies any local/systemic signs infection. She denies any other pedal complaints       ROS:  A complete review of systems is unremarkable outside of HPI       PE:  Healed surgical sites noted to the right foot with no signs of infection or dehiscence       ASSESSMENT:  S/p right first MTPJ fusion, second hammertoe correction and second metatarsal head excision (DOS: 02/18/2022)       PLAN:  Pt is fully cleared from surgical protocol. Activity as tolerated          Casper Parada, [address] [facility], [address] [place] [address] and [facility]  [address] [place] [address] [address], [address] [place] [address]  O: [phone]  F: [phone]  C: [phone]

## 2022-09-06 ENCOUNTER — OFFICE VISIT (OUTPATIENT)
Dept: PODIATRY | Age: 87
End: 2022-09-06
Payer: MEDICARE

## 2022-09-06 VITALS
BODY MASS INDEX: 23.17 KG/M2 | DIASTOLIC BLOOD PRESSURE: 88 MMHG | SYSTOLIC BLOOD PRESSURE: 149 MMHG | HEART RATE: 84 BPM | TEMPERATURE: 97.8 F | HEIGHT: 64 IN | OXYGEN SATURATION: 98 %

## 2022-09-06 DIAGNOSIS — M79.672 LEFT FOOT PAIN: Primary | ICD-10-CM

## 2022-09-06 PROCEDURE — G8432 DEP SCR NOT DOC, RNG: HCPCS | Performed by: PODIATRIST

## 2022-09-06 PROCEDURE — G8536 NO DOC ELDER MAL SCRN: HCPCS | Performed by: PODIATRIST

## 2022-09-06 PROCEDURE — 99213 OFFICE O/P EST LOW 20 MIN: CPT | Performed by: PODIATRIST

## 2022-09-06 PROCEDURE — 1101F PT FALLS ASSESS-DOCD LE1/YR: CPT | Performed by: PODIATRIST

## 2022-09-06 PROCEDURE — G8427 DOCREV CUR MEDS BY ELIG CLIN: HCPCS | Performed by: PODIATRIST

## 2022-09-06 PROCEDURE — G8420 CALC BMI NORM PARAMETERS: HCPCS | Performed by: PODIATRIST

## 2022-09-06 PROCEDURE — 1123F ACP DISCUSS/DSCN MKR DOCD: CPT | Performed by: PODIATRIST

## 2022-09-06 PROCEDURE — 1090F PRES/ABSN URINE INCON ASSESS: CPT | Performed by: PODIATRIST

## 2022-09-06 NOTE — PROGRESS NOTES
1. \"Have you been to the ER, urgent care clinic since your last visit? Hospitalized since your last visit? \" No    2. \"Have you seen or consulted any other health care providers outside of the [address] [place] [address] since your last visit? \" No     3. For patients aged 39-70: Has the patient had a colonoscopy / FIT/ Cologuard? NA - based on age      If the patient is female:    4. For patients aged 41-77: Has the patient had a mammogram within the past 2 years? NA - based on age or sex      11. For patients aged 21-65: Has the patient had a pap smear?  NA - based on age or sex  Chief Complaint   Patient presents with    Foot Pain     L foot none

## 2022-10-06 NOTE — PROGRESS NOTES
[facility]    Subjective:         Patient is a 80 y.o. female who is being seen as a returning pt for left foot evaluation. She states she has left foot pain. She has recently recovered from right foot surgery and believes she may be compensating and now having left foot pain. Patient states the pain is level of 2 out of 10. States pain is exacerbated with close her shoes and weightbearing. She denies any overt trauma. She denies any changes in her activity level or shoe gear. She denies any other pedal complaints      Past Medical History:   Diagnosis Date    Arthritis     Chronic kidney disease     pt unaware of stage; GFR 30 per pateint    Hx of colonoscopy with polypectomy     benign    Hypertension     Mitral valve regurgitation 02/14/2011    mild per Dr. Nazia Guzman; pt stated leaky valve    Other ill-defined conditions(799.89)     cataracts    Other ill-defined conditions(799.89) 1-1-03    fx left ankle-boot only    Pneumonia ~ 7 yrs ago    Varicose veins     left leg worse than right currently     Past Surgical History:   Procedure Laterality Date    HX APPENDECTOMY      HX CATARACT REMOVAL      HX KNEE ARTHROSCOPY  8-29-07    right    HX OPEN CHOLECYSTECTOMY  1962    HX ORTHOPAEDIC  4-12-96    shahid. kelly's neuroma    HX ORTHOPAEDIC      right foot bunionectomy    HX ORTHOPAEDIC  7-11-01    removal of left foot heel spur    HX TONSILLECTOMY      HX TUBAL LIGATION      AR BREAST SURGERY PROCEDURE UNLISTED  1986    excision calcium deposit left breast    AR TOTAL KNEE ARTHROPLASTY  1-14-08    left    VASCULAR SURGERY PROCEDURE UNLIST  1979    left leg vein stripping       History reviewed. No pertinent family history.    Social History     Tobacco Use    Smoking status: Never    Smokeless tobacco: Never   Substance Use Topics    Alcohol use: No     Allergies   Allergen Reactions    Prednisone Rash and Itching     Pt stated does not have any allergies and has taken prednisone with no reaction. Prior to Admission medications    Medication Sig Start Date End Date Taking? Authorizing Provider   ascorbic acid, vitamin C, (Vitamin C) 500 mg tablet Take 500 mg by mouth daily. Provider, Historical   cholecalciferol (Vitamin D3) (1000 Units /25 mcg) tablet Take 1,000 Units by mouth daily. Provider, Historical   cinnamon bark (Cinnamon) 500 mg cap Take  by mouth daily. Provider, Historical   spironolactone (ALDACTONE) 100 mg tablet Take 25 mg by mouth daily. Provider, Historical   amLODIPine (NORVASC) 10 mg tablet Take  by mouth daily. Provider, Historical   furosemide (LASIX) 20 mg tablet Take  by mouth daily. Provider, Historical   potassium chloride (K-DUR, KLOR-CON) 20 mEq tablet Take  by mouth two (2) times a day. Provider, Historical       Review of Systems   Constitutional: Negative. HENT: Negative. Eyes: Negative. Respiratory: Negative. Cardiovascular:  Positive for leg swelling. Gastrointestinal: Negative. Endocrine: Positive for cold intolerance. Genitourinary: Negative. Musculoskeletal:  Positive for arthralgias. Skin: Negative. Allergic/Immunologic: Negative. Neurological: Negative. Hematological: Negative. Psychiatric/Behavioral: Negative. All other systems reviewed and are negative. Objective:     Visit Vitals  BP (!) 149/88 (BP 1 Location: Right upper arm, BP Patient Position: Sitting, BP Cuff Size: Adult)   Pulse 84   Temp 97.8 °F (36.6 °C) (Temporal)   Ht 5' 4\" (1.626 m)   SpO2 98%   BMI 23.17 kg/m²       Physical Exam  Vitals reviewed. Constitutional:       Appearance: She is overweight. Cardiovascular:      Pulses:           Dorsalis pedis pulses are 2+ on the right side and 2+ on the left side. Posterior tibial pulses are 2+ on the right side and 2+ on the left side. Pulmonary:      Effort: Pulmonary effort is normal.   Musculoskeletal:      Right lower leg: No edema.       Left lower leg: No edema.      Right foot: Decreased range of motion. Deformity present. No bunion, Charcot foot, foot drop or prominent metatarsal heads. Left foot: Normal range of motion. Prominent metatarsal heads present. No deformity, bunion, Charcot foot or foot drop. Feet:      Right foot:      Protective Sensation: 10 sites tested. 10 sites sensed. Skin integrity: Skin integrity normal.      Toenail Condition: Right toenails are normal.      Left foot:      Protective Sensation: 10 sites tested. 10 sites sensed. Skin integrity: Skin integrity normal.      Toenail Condition: Left toenails are normal.   Lymphadenopathy:      Lower Body: No right inguinal adenopathy. No left inguinal adenopathy. Skin:     General: Skin is warm. Capillary Refill: Capillary refill takes 2 to 3 seconds. Neurological:      Mental Status: She is alert and oriented to person, place, and time. Psychiatric:         Mood and Affect: Mood and affect normal.         Behavior: Behavior is cooperative. Impression:       ICD-10-CM ICD-9-CM    1. Left foot pain  M79.672 729.5           Recommendation:     Patient seen and evaluated in the office  Discussed and educated patient regarding her current medical condition  Recommended physical therapy but patient declined at this time. She will continue to monitor and if her condition worsens, will return to the office to discuss additional treatment options          Justin A. Albertina Hammans, [address] [facility], [facility] and Karis [address] [place] [address] [address], [address]  O: [phone]  F: [phone]  C: [phone]

## 2024-03-19 ENCOUNTER — APPOINTMENT (OUTPATIENT)
Facility: HOSPITAL | Age: 89
End: 2024-03-19
Attending: EMERGENCY MEDICINE
Payer: MEDICARE

## 2024-03-19 ENCOUNTER — HOSPITAL ENCOUNTER (EMERGENCY)
Facility: HOSPITAL | Age: 89
Discharge: HOME | End: 2024-03-19
Attending: EMERGENCY MEDICINE
Payer: MEDICARE

## 2024-03-19 ENCOUNTER — APPOINTMENT (OUTPATIENT)
Facility: HOSPITAL | Age: 89
End: 2024-03-19
Payer: MEDICARE

## 2024-03-19 VITALS
SYSTOLIC BLOOD PRESSURE: 145 MMHG | RESPIRATION RATE: 21 BRPM | WEIGHT: 135 LBS | HEART RATE: 101 BPM | DIASTOLIC BLOOD PRESSURE: 65 MMHG | BODY MASS INDEX: 23.05 KG/M2 | OXYGEN SATURATION: 99 % | HEIGHT: 64 IN

## 2024-03-19 DIAGNOSIS — R60.0 BILATERAL LOWER EXTREMITY EDEMA: Primary | ICD-10-CM

## 2024-03-19 LAB
ALBUMIN SERPL-MCNC: 2.6 G/DL (ref 3.5–5)
ALBUMIN/GLOB SERPL: 0.6 (ref 1.1–2.2)
ALP SERPL-CCNC: 89 U/L (ref 45–117)
ALT SERPL-CCNC: 10 U/L (ref 12–78)
ANION GAP SERPL CALC-SCNC: 7 MMOL/L (ref 5–15)
AST SERPL W P-5'-P-CCNC: 42 U/L (ref 15–37)
BASOPHILS # BLD: 0.1 K/UL (ref 0–0.1)
BASOPHILS NFR BLD: 2 % (ref 0–1)
BILIRUB SERPL-MCNC: 0.5 MG/DL (ref 0.2–1)
BNP SERPL-MCNC: 6279 PG/ML
BUN SERPL-MCNC: 46 MG/DL (ref 6–20)
BUN/CREAT SERPL: 34 (ref 12–20)
CA-I BLD-MCNC: 8.7 MG/DL (ref 8.5–10.1)
CHLORIDE SERPL-SCNC: 104 MMOL/L (ref 97–108)
CO2 SERPL-SCNC: 26 MMOL/L (ref 21–32)
CREAT SERPL-MCNC: 1.36 MG/DL (ref 0.55–1.02)
DIFFERENTIAL METHOD BLD: ABNORMAL
ECHO BSA: 1.66 M2
EKG ATRIAL RATE: 97 BPM
EKG DIAGNOSIS: NORMAL
EKG P AXIS: 79 DEGREES
EKG P-R INTERVAL: 164 MS
EKG Q-T INTERVAL: 356 MS
EKG QRS DURATION: 80 MS
EKG QTC CALCULATION (BAZETT): 452 MS
EKG R AXIS: 79 DEGREES
EKG T AXIS: 73 DEGREES
EKG VENTRICULAR RATE: 97 BPM
EOSINOPHIL # BLD: 0.9 K/UL (ref 0–0.4)
EOSINOPHIL NFR BLD: 15 % (ref 0–7)
ERYTHROCYTE [DISTWIDTH] IN BLOOD BY AUTOMATED COUNT: 16.2 % (ref 11.5–14.5)
GLOBULIN SER CALC-MCNC: 4.2 G/DL (ref 2–4)
GLUCOSE SERPL-MCNC: 100 MG/DL (ref 65–100)
HCT VFR BLD AUTO: 24.5 % (ref 35–47)
HGB BLD-MCNC: 7.9 G/DL (ref 11.5–16)
IMM GRANULOCYTES # BLD AUTO: 0 K/UL
IMM GRANULOCYTES NFR BLD AUTO: 0 %
LYMPHOCYTES # BLD: 1.2 K/UL (ref 0.8–3.5)
LYMPHOCYTES NFR BLD: 20 % (ref 12–49)
MCH RBC QN AUTO: 28.2 PG (ref 26–34)
MCHC RBC AUTO-ENTMCNC: 32.2 G/DL (ref 30–36.5)
MCV RBC AUTO: 87.5 FL (ref 80–99)
MONOCYTES # BLD: 0.3 K/UL (ref 0–1)
MONOCYTES NFR BLD: 5 % (ref 5–13)
NEUTS BAND NFR BLD MANUAL: 9 % (ref 0–6)
NEUTS SEG # BLD: 3.7 K/UL (ref 1.8–8)
NEUTS SEG NFR BLD: 49 % (ref 32–75)
NRBC # BLD: 0 K/UL (ref 0–0.01)
NRBC BLD-RTO: 0 PER 100 WBC
PLATELET # BLD AUTO: 396 K/UL (ref 150–400)
PMV BLD AUTO: 10 FL (ref 8.9–12.9)
POTASSIUM SERPL-SCNC: 4.3 MMOL/L (ref 3.5–5.1)
PROT SERPL-MCNC: 6.8 G/DL (ref 6.4–8.2)
RBC # BLD AUTO: 2.8 M/UL (ref 3.8–5.2)
RBC MORPH BLD: ABNORMAL
SODIUM SERPL-SCNC: 137 MMOL/L (ref 136–145)
TROPONIN I SERPL HS-MCNC: 20 NG/L (ref 0–51)
WBC # BLD AUTO: 6.2 K/UL (ref 3.6–11)

## 2024-03-19 PROCEDURE — 80053 COMPREHEN METABOLIC PANEL: CPT

## 2024-03-19 PROCEDURE — 85025 COMPLETE CBC W/AUTO DIFF WBC: CPT

## 2024-03-19 PROCEDURE — 99285 EMERGENCY DEPT VISIT HI MDM: CPT

## 2024-03-19 PROCEDURE — 83880 ASSAY OF NATRIURETIC PEPTIDE: CPT

## 2024-03-19 PROCEDURE — 84484 ASSAY OF TROPONIN QUANT: CPT

## 2024-03-19 PROCEDURE — 93970 EXTREMITY STUDY: CPT

## 2024-03-19 PROCEDURE — 6370000000 HC RX 637 (ALT 250 FOR IP): Performed by: EMERGENCY MEDICINE

## 2024-03-19 PROCEDURE — 71045 X-RAY EXAM CHEST 1 VIEW: CPT

## 2024-03-19 PROCEDURE — 93005 ELECTROCARDIOGRAM TRACING: CPT | Performed by: EMERGENCY MEDICINE

## 2024-03-19 PROCEDURE — 36415 COLL VENOUS BLD VENIPUNCTURE: CPT

## 2024-03-19 RX ORDER — TRAMADOL HYDROCHLORIDE 50 MG/1
50 TABLET ORAL EVERY 8 HOURS PRN
Qty: 15 TABLET | Refills: 0 | Status: SHIPPED | OUTPATIENT
Start: 2024-03-19 | End: 2024-03-24

## 2024-03-19 RX ORDER — FUROSEMIDE 40 MG/1
40 TABLET ORAL 2 TIMES DAILY
Qty: 30 TABLET | Refills: 0 | Status: SHIPPED | OUTPATIENT
Start: 2024-03-19

## 2024-03-19 RX ORDER — TRAMADOL HYDROCHLORIDE 50 MG/1
50 TABLET ORAL
Status: COMPLETED | OUTPATIENT
Start: 2024-03-19 | End: 2024-03-19

## 2024-03-19 RX ADMIN — TRAMADOL HYDROCHLORIDE 50 MG: 50 TABLET ORAL at 16:52

## 2024-03-19 ASSESSMENT — PAIN - FUNCTIONAL ASSESSMENT: PAIN_FUNCTIONAL_ASSESSMENT: 0-10

## 2024-03-19 NOTE — ED TRIAGE NOTES
Reports has been having lower extremity edema for some time and has been given fluid pills. Reports legs have gotten even more swollen and are red. Pt reports spoke with Dr. Pichardo who advised to come to the ED.

## 2024-03-19 NOTE — ED PROVIDER NOTES
[facility] EMERGENCY DEPT  EMERGENCY DEPARTMENT HISTORY AND PHYSICAL EXAM      Date: 3/19/2024  Patient Name: Cally Stephenson  MRN: [id number]  Birthdate 7/10/1934  Date of evaluation: 3/19/2024  Provider: Cleve Elder MD   Note Started: 2:57 PM EDT 3/19/24    HISTORY OF PRESENT ILLNESS     Chief Complaint   Patient presents with    Edema       History Provided By: Patient    HPI: Cally Stephenson is a 89 y.o. female presents to the emergency department for evaluation of bilateral lower extremity edema.  Patient reports she has had chronic edema in the legs but states it appears to be worse.  Patient denies fevers or chills.  Patient states she used to take Lasix but was taken off of it by her primary care doctor.  Patient states that she spoke to Dr. Diaz who advised her to come to the ER for evaluation.    PAST MEDICAL HISTORY   Past Medical History:  Past Medical History:   Diagnosis Date    Arthritis     Chronic kidney disease     pt unaware of stage; GFR 30 per pateint    Hx of colonoscopy with polypectomy     benign    Hypertension     Mitral valve regurgitation 02/14/2011    mild per Dr. Augusto Shirley; pt stated leaky valve    Other ill-defined conditions(799.89)     cataracts    Other ill-defined conditions(799.89) 1-1-03    fx left ankle-boot only    Pneumonia      7 yrs ago    Varicose veins     left leg worse than right currently       Past Surgical History:  Past Surgical History:   Procedure Laterality Date    APPENDECTOMY      BREAST SURGERY  1986    excision calcium deposit left breast    CATARACT REMOVAL      CHOLECYSTECTOMY, OPEN  1962    KNEE ARTHROSCOPY  8-29-07    right    ORTHOPEDIC SURGERY  4-12-96    kilo. alejo's neuroma    ORTHOPEDIC SURGERY      right foot bunionectomy    ORTHOPEDIC SURGERY  7-11-01    removal of left foot heel spur    TONSILLECTOMY      TOTAL KNEE ARTHROPLASTY  1-14-08    left    TUBAL LIGATION      VASCULAR SURGERY  1979    left leg vein stripping

## 2024-03-19 NOTE — DISCHARGE INSTRUCTIONS
Thank you!  Thank you for allowing me to care for you in the emergency department. It is my goal to provide you with excellent care.  Please fill out the survey that will come to you by mail or email since we listen to your feedback!     Below you will find a list of your tests from today's visit.  Should you have any questions, please do not hesitate to call the emergency department.    Labs  Recent Results (from the past 12 hour(s))   EKG 12 Lead    Collection Time: 03/19/24  2:49 PM   Result Value Ref Range    Ventricular Rate 97 BPM    Atrial Rate 97 BPM    P-R Interval 164 ms    QRS Duration 80 ms    Q-T Interval 356 ms    QTc Calculation (Bazett) 452 ms    P Axis 79 degrees    R Axis 79 degrees    T Axis 73 degrees    Diagnosis       Sinus rhythm with Premature atrial complexes  Otherwise normal ECG  When compared with ECG of 18-FEB-2022 06:41,  Premature atrial complexes are now Present  Questionable change in QRS axis  Nonspecific T wave abnormality no longer evident in Inferior leads  Confirmed by MADAN WESTBROOK ([id number]) on 3/19/2024 3:55:41 PM     CBC with Auto Differential    Collection Time: 03/19/24  3:18 PM   Result Value Ref Range    WBC 6.2 3.6 - 11.0 K/uL    RBC 2.80 (L) 3.80 - 5.20 M/uL    Hemoglobin 7.9 (L) 11.5 - 16.0 g/dL    Hematocrit 24.5 (L) 35.0 - 47.0 %    MCV 87.5 80.0 - 99.0 FL    MCH 28.2 26.0 - 34.0 PG    MCHC 32.2 30.0 - 36.5 g/dL    RDW 16.2 (H) 11.5 - 14.5 %    Platelets 396 150 - 400 K/uL    MPV 10.0 8.9 - 12.9 FL    Nucleated RBCs 0.0 0.0  WBC    nRBC 0.00 0.00 - 0.01 K/uL    Neutrophils % 49 32 - 75 %    Band Neutrophils 9 (H) 0 - 6 %    Lymphocytes % 20 12 - 49 %    Monocytes % 5 5 - 13 %    Eosinophils % 15 (H) 0 - 7 %    Basophils % 2 (H) 0 - 1 %    Immature Granulocytes 0 %    Neutrophils Absolute 3.7 1.8 - 8.0 K/UL    Lymphocytes Absolute 1.2 0.8 - 3.5 K/UL    Monocytes Absolute 0.3 0.0 - 1.0 K/UL    Eosinophils Absolute 0.9 (H) 0.0 - 0.4 K/UL    Basophils Absolute

## 2024-04-09 ENCOUNTER — APPOINTMENT (OUTPATIENT)
Facility: HOSPITAL | Age: 89
DRG: 291 | End: 2024-04-09
Payer: MEDICARE

## 2024-04-09 ENCOUNTER — HOSPITAL ENCOUNTER (INPATIENT)
Facility: HOSPITAL | Age: 89
LOS: 2 days | Discharge: HOME | DRG: 291 | End: 2024-04-11
Attending: STUDENT IN AN ORGANIZED HEALTH CARE EDUCATION/TRAINING PROGRAM | Admitting: INTERNAL MEDICINE
Payer: MEDICARE

## 2024-04-09 DIAGNOSIS — I50.9 HEART FAILURE EXACERBATED BY SOTALOL (HCC): ICD-10-CM

## 2024-04-09 DIAGNOSIS — R52 PAIN: ICD-10-CM

## 2024-04-09 DIAGNOSIS — I50.9 ACUTE ON CHRONIC CONGESTIVE HEART FAILURE, UNSPECIFIED HEART FAILURE TYPE (HCC): Primary | ICD-10-CM

## 2024-04-09 PROBLEM — I50.41 ACUTE COMBINED SYSTOLIC AND DIASTOLIC CHF, NYHA CLASS 3 (HCC): Status: ACTIVE | Noted: 2024-04-09

## 2024-04-09 LAB
ALBUMIN SERPL-MCNC: 2.4 G/DL (ref 3.5–5)
ALBUMIN/GLOB SERPL: 0.6 (ref 1.1–2.2)
ALP SERPL-CCNC: 90 U/L (ref 45–117)
ALT SERPL-CCNC: 8 U/L (ref 12–78)
ANION GAP SERPL CALC-SCNC: 2 MMOL/L (ref 5–15)
AST SERPL W P-5'-P-CCNC: 14 U/L (ref 15–37)
BASOPHILS # BLD: 0.1 K/UL (ref 0–0.1)
BASOPHILS NFR BLD: 1 % (ref 0–1)
BILIRUB SERPL-MCNC: 0.3 MG/DL (ref 0.2–1)
BNP SERPL-MCNC: 6855 PG/ML
BUN SERPL-MCNC: 27 MG/DL (ref 6–20)
BUN/CREAT SERPL: 21 (ref 12–20)
CA-I BLD-MCNC: 8.6 MG/DL (ref 8.5–10.1)
CHLORIDE SERPL-SCNC: 109 MMOL/L (ref 97–108)
CO2 SERPL-SCNC: 27 MMOL/L (ref 21–32)
CREAT SERPL-MCNC: 1.29 MG/DL (ref 0.55–1.02)
DIFFERENTIAL METHOD BLD: ABNORMAL
EOSINOPHIL # BLD: 1.1 K/UL (ref 0–0.4)
EOSINOPHIL NFR BLD: 20 % (ref 0–7)
ERYTHROCYTE [DISTWIDTH] IN BLOOD BY AUTOMATED COUNT: 16.6 % (ref 11.5–14.5)
GLOBULIN SER CALC-MCNC: 4 G/DL (ref 2–4)
GLUCOSE SERPL-MCNC: 118 MG/DL (ref 65–100)
HCT VFR BLD AUTO: 23.7 % (ref 35–47)
HGB BLD-MCNC: 7.2 G/DL (ref 11.5–16)
IMM GRANULOCYTES # BLD AUTO: 0 K/UL
IMM GRANULOCYTES NFR BLD AUTO: 0 %
LYMPHOCYTES # BLD: 0.9 K/UL (ref 0.8–3.5)
LYMPHOCYTES NFR BLD: 17 % (ref 12–49)
MCH RBC QN AUTO: 26.7 PG (ref 26–34)
MCHC RBC AUTO-ENTMCNC: 30.4 G/DL (ref 30–36.5)
MCV RBC AUTO: 87.8 FL (ref 80–99)
MONOCYTES # BLD: 0.4 K/UL (ref 0–1)
MONOCYTES NFR BLD: 8 % (ref 5–13)
NEUTS BAND NFR BLD MANUAL: 2 % (ref 0–6)
NEUTS SEG # BLD: 2.9 K/UL (ref 1.8–8)
NEUTS SEG NFR BLD: 52 % (ref 32–75)
NRBC # BLD: 0 K/UL (ref 0–0.01)
NRBC BLD-RTO: 0 PER 100 WBC
PLATELET # BLD AUTO: 313 K/UL (ref 150–400)
PMV BLD AUTO: 9 FL (ref 8.9–12.9)
POTASSIUM SERPL-SCNC: 3.6 MMOL/L (ref 3.5–5.1)
PROT SERPL-MCNC: 6.4 G/DL (ref 6.4–8.2)
RBC # BLD AUTO: 2.7 M/UL (ref 3.8–5.2)
RBC MORPH BLD: ABNORMAL
SODIUM SERPL-SCNC: 138 MMOL/L (ref 136–145)
TROPONIN I SERPL HS-MCNC: 12 NG/L (ref 0–51)
TSH SERPL DL<=0.05 MIU/L-ACNC: 4.98 UIU/ML (ref 0.36–3.74)
WBC # BLD AUTO: 5.4 K/UL (ref 3.6–11)

## 2024-04-09 PROCEDURE — 80053 COMPREHEN METABOLIC PANEL: CPT

## 2024-04-09 PROCEDURE — 71045 X-RAY EXAM CHEST 1 VIEW: CPT

## 2024-04-09 PROCEDURE — 36415 COLL VENOUS BLD VENIPUNCTURE: CPT

## 2024-04-09 PROCEDURE — 93005 ELECTROCARDIOGRAM TRACING: CPT | Performed by: STUDENT IN AN ORGANIZED HEALTH CARE EDUCATION/TRAINING PROGRAM

## 2024-04-09 PROCEDURE — 84484 ASSAY OF TROPONIN QUANT: CPT

## 2024-04-09 PROCEDURE — 83540 ASSAY OF IRON: CPT

## 2024-04-09 PROCEDURE — 6360000002 HC RX W HCPCS: Performed by: STUDENT IN AN ORGANIZED HEALTH CARE EDUCATION/TRAINING PROGRAM

## 2024-04-09 PROCEDURE — 83880 ASSAY OF NATRIURETIC PEPTIDE: CPT

## 2024-04-09 PROCEDURE — 6360000002 HC RX W HCPCS: Performed by: INTERNAL MEDICINE

## 2024-04-09 PROCEDURE — 2060000000 HC ICU INTERMEDIATE R&B

## 2024-04-09 PROCEDURE — 6370000000 HC RX 637 (ALT 250 FOR IP): Performed by: INTERNAL MEDICINE

## 2024-04-09 PROCEDURE — 99285 EMERGENCY DEPT VISIT HI MDM: CPT

## 2024-04-09 PROCEDURE — 2580000003 HC RX 258: Performed by: INTERNAL MEDICINE

## 2024-04-09 PROCEDURE — 84443 ASSAY THYROID STIM HORMONE: CPT

## 2024-04-09 PROCEDURE — 85025 COMPLETE CBC W/AUTO DIFF WBC: CPT

## 2024-04-09 RX ORDER — VITAMIN B COMPLEX
1000 TABLET ORAL DAILY
Status: DISCONTINUED | OUTPATIENT
Start: 2024-04-09 | End: 2024-04-11 | Stop reason: HOSPADM

## 2024-04-09 RX ORDER — ONDANSETRON 4 MG/1
4 TABLET, ORALLY DISINTEGRATING ORAL EVERY 8 HOURS PRN
Status: DISCONTINUED | OUTPATIENT
Start: 2024-04-09 | End: 2024-04-11 | Stop reason: HOSPADM

## 2024-04-09 RX ORDER — SODIUM CHLORIDE 0.9 % (FLUSH) 0.9 %
5-40 SYRINGE (ML) INJECTION PRN
Status: DISCONTINUED | OUTPATIENT
Start: 2024-04-09 | End: 2024-04-11 | Stop reason: HOSPADM

## 2024-04-09 RX ORDER — FUROSEMIDE 10 MG/ML
40 INJECTION INTRAMUSCULAR; INTRAVENOUS ONCE
Status: COMPLETED | OUTPATIENT
Start: 2024-04-09 | End: 2024-04-09

## 2024-04-09 RX ORDER — SPIRONOLACTONE 25 MG/1
25 TABLET ORAL DAILY
Status: DISCONTINUED | OUTPATIENT
Start: 2024-04-09 | End: 2024-04-11 | Stop reason: HOSPADM

## 2024-04-09 RX ORDER — ONDANSETRON 2 MG/ML
4 INJECTION INTRAMUSCULAR; INTRAVENOUS EVERY 6 HOURS PRN
Status: DISCONTINUED | OUTPATIENT
Start: 2024-04-09 | End: 2024-04-11 | Stop reason: HOSPADM

## 2024-04-09 RX ORDER — POLYETHYLENE GLYCOL 3350 17 G/17G
17 POWDER, FOR SOLUTION ORAL DAILY PRN
Status: DISCONTINUED | OUTPATIENT
Start: 2024-04-09 | End: 2024-04-11 | Stop reason: HOSPADM

## 2024-04-09 RX ORDER — ASCORBIC ACID 500 MG
500 TABLET ORAL DAILY
Status: DISCONTINUED | OUTPATIENT
Start: 2024-04-09 | End: 2024-04-11 | Stop reason: HOSPADM

## 2024-04-09 RX ORDER — SODIUM CHLORIDE 9 MG/ML
INJECTION, SOLUTION INTRAVENOUS PRN
Status: DISCONTINUED | OUTPATIENT
Start: 2024-04-09 | End: 2024-04-11 | Stop reason: HOSPADM

## 2024-04-09 RX ORDER — FUROSEMIDE 10 MG/ML
40 INJECTION INTRAMUSCULAR; INTRAVENOUS 2 TIMES DAILY
Status: DISCONTINUED | OUTPATIENT
Start: 2024-04-09 | End: 2024-04-11 | Stop reason: HOSPADM

## 2024-04-09 RX ORDER — ACETAMINOPHEN 325 MG/1
650 TABLET ORAL EVERY 6 HOURS PRN
Status: DISCONTINUED | OUTPATIENT
Start: 2024-04-09 | End: 2024-04-11 | Stop reason: HOSPADM

## 2024-04-09 RX ORDER — POTASSIUM CHLORIDE 20 MEQ/1
20 TABLET, EXTENDED RELEASE ORAL 2 TIMES DAILY
Status: DISCONTINUED | OUTPATIENT
Start: 2024-04-09 | End: 2024-04-11 | Stop reason: HOSPADM

## 2024-04-09 RX ORDER — ENOXAPARIN SODIUM 100 MG/ML
30 INJECTION SUBCUTANEOUS DAILY
Status: DISCONTINUED | OUTPATIENT
Start: 2024-04-09 | End: 2024-04-11 | Stop reason: HOSPADM

## 2024-04-09 RX ORDER — CARVEDILOL 3.12 MG/1
3.12 TABLET ORAL 2 TIMES DAILY WITH MEALS
Status: DISCONTINUED | OUTPATIENT
Start: 2024-04-09 | End: 2024-04-11 | Stop reason: HOSPADM

## 2024-04-09 RX ORDER — SODIUM CHLORIDE 0.9 % (FLUSH) 0.9 %
5-40 SYRINGE (ML) INJECTION EVERY 12 HOURS SCHEDULED
Status: DISCONTINUED | OUTPATIENT
Start: 2024-04-09 | End: 2024-04-11 | Stop reason: HOSPADM

## 2024-04-09 RX ORDER — ACETAMINOPHEN 650 MG/1
650 SUPPOSITORY RECTAL EVERY 6 HOURS PRN
Status: DISCONTINUED | OUTPATIENT
Start: 2024-04-09 | End: 2024-04-11 | Stop reason: HOSPADM

## 2024-04-09 RX ADMIN — FUROSEMIDE 40 MG: 10 INJECTION, SOLUTION INTRAMUSCULAR; INTRAVENOUS at 18:57

## 2024-04-09 RX ADMIN — SODIUM CHLORIDE, PRESERVATIVE FREE 10 ML: 5 INJECTION INTRAVENOUS at 20:05

## 2024-04-09 RX ADMIN — ENOXAPARIN SODIUM 30 MG: 100 INJECTION SUBCUTANEOUS at 20:05

## 2024-04-09 RX ADMIN — POTASSIUM CHLORIDE 20 MEQ: 1500 TABLET, EXTENDED RELEASE ORAL at 20:04

## 2024-04-09 RX ADMIN — OXYCODONE HYDROCHLORIDE AND ACETAMINOPHEN 500 MG: 500 TABLET ORAL at 20:05

## 2024-04-09 RX ADMIN — SPIRONOLACTONE 25 MG: 25 TABLET ORAL at 20:05

## 2024-04-09 RX ADMIN — CARVEDILOL 3.12 MG: 3.12 TABLET, FILM COATED ORAL at 20:05

## 2024-04-09 ASSESSMENT — LIFESTYLE VARIABLES
HOW OFTEN DO YOU HAVE A DRINK CONTAINING ALCOHOL: PATIENT DECLINED
HOW MANY STANDARD DRINKS CONTAINING ALCOHOL DO YOU HAVE ON A TYPICAL DAY: PATIENT DECLINED

## 2024-04-09 NOTE — ED TRIAGE NOTES
Was sent by dr contreras for bilateral leg swelling; denies cp/sob or any complaints. States has been taking meds as prescribed, takes lasix

## 2024-04-09 NOTE — H&P
Department of Internal Medicine  General Internal Medicine  Attending History and Physical      CHIEF COMPLAINT:      Reason for Admission: Acute severe CHF  Chronic kidney disease stage IIIb  Hypertension  Bilateral leg swelling    History Obtained From:  patient, electronic medical record    HISTORY OF PRESENT ILLNESS:      The patient is a 89 y.o. female with significant past medical history of chronic kidney disease, mitral valve regurgitation, hypertension who presents with swelling of both legs with negative Doppler studies sent to the hospital for elevated BNP above 6000 patient is a poor historian denies any chest pain denies any shortness of breath denies any fever no chills    Past Medical History:        Diagnosis Date    Arthritis     Chronic kidney disease     pt unaware of stage; GFR 30 per pateint    Hx of colonoscopy with polypectomy     benign    Hypertension     Mitral valve regurgitation 02/14/2011    mild per Dr. Augusto Shirley; pt stated leaky valve    Other ill-defined conditions(799.89)     cataracts    Other ill-defined conditions(799.89) 1-1-03    fx left ankle-boot only    Pneumonia      7 yrs ago    Varicose veins     left leg worse than right currently     Past Surgical History:        Procedure Laterality Date    APPENDECTOMY      BREAST SURGERY  1986    excision calcium deposit left breast    CATARACT REMOVAL      CHOLECYSTECTOMY, OPEN  1962    KNEE ARTHROSCOPY  8-29-07    right    ORTHOPEDIC SURGERY  4-12-96    kilo. alejo's neuroma    ORTHOPEDIC SURGERY      right foot bunionectomy    ORTHOPEDIC SURGERY  7-11-01    removal of left foot heel spur    TONSILLECTOMY      TOTAL KNEE ARTHROPLASTY  1-14-08    left    TUBAL LIGATION      VASCULAR SURGERY  1979    left leg vein stripping     Immunizations:                Influenza:  Indicated for current flu vaccination season Oct. to Feb.            Pneumococcal Polysaccharide:  Indicated for current flu vaccination season Oct. to

## 2024-04-09 NOTE — ED PROVIDER NOTES
[facility] EMERGENCY DEPT  EMERGENCY DEPARTMENT HISTORY AND PHYSICAL EXAM      Date: 4/9/2024  Patient Name: Cally Stephenson  MRN: [id number]  Birthdate 7/10/1934  Date of evaluation: 4/9/2024  Provider: David Nam MD   Note Started: 6:08 PM EDT 4/9/24    HISTORY OF PRESENT ILLNESS     Chief Complaint   Patient presents with    Leg Swelling       History Provided By: Patient    HPI: Cally Stephenson is a 89 y.o. female with past medical history as reviewed below presents for evaluation of lower extremity edema.  Patient continues to take her Lasix as prescribed.  She was seen here 2 weeks prior and diagnosed with lower extremity edema, increased outpatient Lasix regimen with no improvement in her symptoms.  Legs are swollen symmetrically, now worse than before and making it difficult for her to walk.  No associated dyspnea, no chest pain, no change in her urinary output.    PAST MEDICAL HISTORY   Past Medical History:  Past Medical History:   Diagnosis Date    Arthritis     Chronic kidney disease     pt unaware of stage; GFR 30 per pateint    Hx of colonoscopy with polypectomy     benign    Hypertension     Mitral valve regurgitation 02/14/2011    mild per Dr. Augusto Shirley; pt stated leaky valve    Other ill-defined conditions(799.89)     cataracts    Other ill-defined conditions(799.89) 1-1-03    fx left ankle-boot only    Pneumonia      7 yrs ago    Varicose veins     left leg worse than right currently       Past Surgical History:  Past Surgical History:   Procedure Laterality Date    APPENDECTOMY      BREAST SURGERY  1986    excision calcium deposit left breast    CATARACT REMOVAL      CHOLECYSTECTOMY, OPEN  1962    KNEE ARTHROSCOPY  8-29-07    right    ORTHOPEDIC SURGERY  4-12-96    kilo. alejo's neuroma    ORTHOPEDIC SURGERY      right foot bunionectomy    ORTHOPEDIC SURGERY  7-11-01    removal of left foot heel spur    TONSILLECTOMY      TOTAL KNEE ARTHROPLASTY  1-14-08    left    TUBAL

## 2024-04-10 ENCOUNTER — APPOINTMENT (OUTPATIENT)
Facility: HOSPITAL | Age: 89
DRG: 291 | End: 2024-04-10
Payer: MEDICARE

## 2024-04-10 ENCOUNTER — APPOINTMENT (OUTPATIENT)
Facility: HOSPITAL | Age: 89
DRG: 291 | End: 2024-04-10
Attending: INTERNAL MEDICINE
Payer: MEDICARE

## 2024-04-10 LAB
ANION GAP SERPL CALC-SCNC: 2 MMOL/L (ref 5–15)
APPEARANCE UR: CLEAR
BACTERIA URNS QL MICRO: ABNORMAL /HPF
BILIRUB UR QL: NEGATIVE
BUN SERPL-MCNC: 27 MG/DL (ref 6–20)
BUN/CREAT SERPL: 24 (ref 12–20)
CA-I BLD-MCNC: 8.4 MG/DL (ref 8.5–10.1)
CHLORIDE SERPL-SCNC: 110 MMOL/L (ref 97–108)
CHLORIDE UR-SCNC: 141 MMOL/L
CO2 SERPL-SCNC: 28 MMOL/L (ref 21–32)
COLOR UR: ABNORMAL
CREAT SERPL-MCNC: 1.11 MG/DL (ref 0.55–1.02)
CREAT UR-MCNC: 24 MG/DL
EKG ATRIAL RATE: 84 BPM
EKG DIAGNOSIS: NORMAL
EKG P AXIS: 64 DEGREES
EKG P-R INTERVAL: 172 MS
EKG Q-T INTERVAL: 376 MS
EKG QRS DURATION: 84 MS
EKG QTC CALCULATION (BAZETT): 444 MS
EKG R AXIS: 70 DEGREES
EKG T AXIS: 25 DEGREES
EKG VENTRICULAR RATE: 84 BPM
EPITH CASTS URNS QL MICRO: ABNORMAL /LPF
GLUCOSE SERPL-MCNC: 72 MG/DL (ref 65–100)
GLUCOSE UR STRIP.AUTO-MCNC: NEGATIVE MG/DL
HGB UR QL STRIP: NEGATIVE
IRON SATN MFR SERPL: 17 % (ref 20–50)
IRON SERPL-MCNC: 32 UG/DL (ref 35–150)
KETONES UR QL STRIP.AUTO: NEGATIVE MG/DL
LEUKOCYTE ESTERASE UR QL STRIP.AUTO: ABNORMAL
MAGNESIUM SERPL-MCNC: 1.6 MG/DL (ref 1.6–2.4)
NITRITE UR QL STRIP.AUTO: NEGATIVE
PH UR STRIP: 5 (ref 5–8)
POTASSIUM SERPL-SCNC: 3.6 MMOL/L (ref 3.5–5.1)
PROT UR STRIP-MCNC: NEGATIVE MG/DL
PROT UR-MCNC: 9 MG/DL (ref 0–11.9)
PROT/CREAT UR-RTO: 0.4
RBC #/AREA URNS HPF: ABNORMAL /HPF (ref 0–5)
SODIUM SERPL-SCNC: 140 MMOL/L (ref 136–145)
SODIUM UR-SCNC: 125 MMOL/L
SP GR UR REFRACTOMETRY: 1.01 (ref 1–1.03)
TIBC SERPL-MCNC: 188 UG/DL (ref 250–450)
TROPONIN I SERPL HS-MCNC: 15 NG/L (ref 0–51)
URINE CULTURE IF INDICATED: ABNORMAL
UROBILINOGEN UR QL STRIP.AUTO: 0.1 EU/DL (ref 0.1–1)
WBC URNS QL MICRO: ABNORMAL /HPF (ref 0–4)

## 2024-04-10 PROCEDURE — 6360000002 HC RX W HCPCS

## 2024-04-10 PROCEDURE — 80061 LIPID PANEL: CPT

## 2024-04-10 PROCEDURE — 6370000000 HC RX 637 (ALT 250 FOR IP): Performed by: INTERNAL MEDICINE

## 2024-04-10 PROCEDURE — 82570 ASSAY OF URINE CREATININE: CPT

## 2024-04-10 PROCEDURE — 76770 US EXAM ABDO BACK WALL COMP: CPT

## 2024-04-10 PROCEDURE — 84300 ASSAY OF URINE SODIUM: CPT

## 2024-04-10 PROCEDURE — 82436 ASSAY OF URINE CHLORIDE: CPT

## 2024-04-10 PROCEDURE — 6360000002 HC RX W HCPCS: Performed by: INTERNAL MEDICINE

## 2024-04-10 PROCEDURE — 81001 URINALYSIS AUTO W/SCOPE: CPT

## 2024-04-10 PROCEDURE — 83735 ASSAY OF MAGNESIUM: CPT

## 2024-04-10 PROCEDURE — 2580000003 HC RX 258: Performed by: INTERNAL MEDICINE

## 2024-04-10 PROCEDURE — 80048 BASIC METABOLIC PNL TOTAL CA: CPT

## 2024-04-10 PROCEDURE — 84484 ASSAY OF TROPONIN QUANT: CPT

## 2024-04-10 PROCEDURE — 2060000000 HC ICU INTERMEDIATE R&B

## 2024-04-10 PROCEDURE — 84156 ASSAY OF PROTEIN URINE: CPT

## 2024-04-10 RX ORDER — OXYCODONE HYDROCHLORIDE 5 MG/1
5 TABLET ORAL EVERY 8 HOURS PRN
Status: DISCONTINUED | OUTPATIENT
Start: 2024-04-10 | End: 2024-04-11 | Stop reason: HOSPADM

## 2024-04-10 RX ADMIN — ACETAMINOPHEN 650 MG: 325 TABLET ORAL at 17:19

## 2024-04-10 RX ADMIN — ACETAMINOPHEN 650 MG: 325 TABLET ORAL at 23:08

## 2024-04-10 RX ADMIN — ENOXAPARIN SODIUM 30 MG: 100 INJECTION SUBCUTANEOUS at 09:02

## 2024-04-10 RX ADMIN — SODIUM CHLORIDE, PRESERVATIVE FREE 10 ML: 5 INJECTION INTRAVENOUS at 20:25

## 2024-04-10 RX ADMIN — Medication 1000 UNITS: at 08:59

## 2024-04-10 RX ADMIN — POTASSIUM CHLORIDE 20 MEQ: 1500 TABLET, EXTENDED RELEASE ORAL at 20:25

## 2024-04-10 RX ADMIN — OXYCODONE HYDROCHLORIDE AND ACETAMINOPHEN 500 MG: 500 TABLET ORAL at 08:59

## 2024-04-10 RX ADMIN — FUROSEMIDE 40 MG: 10 INJECTION, SOLUTION INTRAMUSCULAR; INTRAVENOUS at 20:27

## 2024-04-10 RX ADMIN — ACETAMINOPHEN 650 MG: 325 TABLET ORAL at 11:33

## 2024-04-10 RX ADMIN — SODIUM CHLORIDE, PRESERVATIVE FREE 10 ML: 5 INJECTION INTRAVENOUS at 09:00

## 2024-04-10 RX ADMIN — SODIUM CHLORIDE, PRESERVATIVE FREE 10 ML: 5 INJECTION INTRAVENOUS at 09:04

## 2024-04-10 RX ADMIN — EPOETIN ALFA-EPBX 10000 UNITS: 10000 INJECTION, SOLUTION INTRAVENOUS; SUBCUTANEOUS at 18:15

## 2024-04-10 RX ADMIN — POTASSIUM CHLORIDE 20 MEQ: 1500 TABLET, EXTENDED RELEASE ORAL at 08:59

## 2024-04-10 ASSESSMENT — PAIN DESCRIPTION - FREQUENCY
FREQUENCY: INTERMITTENT
FREQUENCY: INTERMITTENT

## 2024-04-10 ASSESSMENT — PAIN DESCRIPTION - LOCATION
LOCATION: ANKLE;FOOT
LOCATION: ANKLE;FOOT
LOCATION: FOOT
LOCATION: ANKLE;FOOT

## 2024-04-10 ASSESSMENT — PAIN DESCRIPTION - DESCRIPTORS
DESCRIPTORS: TIGHTNESS
DESCRIPTORS: TIGHTNESS;DISCOMFORT
DESCRIPTORS: TIGHTNESS;DISCOMFORT
DESCRIPTORS: ACHING

## 2024-04-10 ASSESSMENT — PAIN SCALES - GENERAL
PAINLEVEL_OUTOF10: 8
PAINLEVEL_OUTOF10: 8
PAINLEVEL_OUTOF10: 7
PAINLEVEL_OUTOF10: 5
PAINLEVEL_OUTOF10: 8
PAINLEVEL_OUTOF10: 5
PAINLEVEL_OUTOF10: 6
PAINLEVEL_OUTOF10: 0
PAINLEVEL_OUTOF10: 8

## 2024-04-10 ASSESSMENT — PAIN DESCRIPTION - PAIN TYPE
TYPE: ACUTE PAIN
TYPE: ACUTE PAIN

## 2024-04-10 ASSESSMENT — PAIN DESCRIPTION - ORIENTATION
ORIENTATION: RIGHT;LEFT
ORIENTATION: LEFT;RIGHT
ORIENTATION: LEFT;RIGHT
ORIENTATION: RIGHT;LEFT

## 2024-04-10 ASSESSMENT — PAIN - FUNCTIONAL ASSESSMENT
PAIN_FUNCTIONAL_ASSESSMENT: PREVENTS OR INTERFERES SOME ACTIVE ACTIVITIES AND ADLS
PAIN_FUNCTIONAL_ASSESSMENT: ACTIVITIES ARE NOT PREVENTED
PAIN_FUNCTIONAL_ASSESSMENT: PREVENTS OR INTERFERES SOME ACTIVE ACTIVITIES AND ADLS

## 2024-04-10 ASSESSMENT — PAIN DESCRIPTION - ONSET
ONSET: ON-GOING
ONSET: ON-GOING

## 2024-04-10 NOTE — NURSE NAVIGATOR
float patient's heels. The nurse is notified. The patient has an Echo ordered. The patient states she cannot remember her last EF. The patient lives with her  and she does the cooking. She states her daughter in laws can help with cooking, but her  does not cook. The patient states she does not add salt or use high sodium products. The patient is a renal stage III. Patient will need close follow-up by cardiology and renal.

## 2024-04-10 NOTE — PROGRESS NOTES
Nutrition Education    Educated on HH Diet ed   Learners:  Care in progress, materials left in room  Readiness:  Other   Method: Handout  Response:  Other   Contact name and number provided.    Pt receiving care at time of visit, RD left materials in room, received HH diet education from NN today. Please consult if additional questions/concerns arise.   Zoila Lewis RD  Contact Number: [phone]

## 2024-04-10 NOTE — ED NOTES
ED TO INPATIENT SBAR HANDOFF    Patient Name: Cally Stephenson   Preferred Name: Cally  : 7/10/1934  89 y.o.   Family/Caregiver Present: no   Code Status Order: Full Code  PO Status: regular  Telemetry Order:   C-SSRS: Risk of Suicide: No Risk  Sitter no   Restraints:     Sepsis Risk Score Sepsis Risk Score: 0.72    Situation  Chief Complaint   Patient presents with    Leg Swelling     Brief Description of Patient's Condition: Presented to ED for bilateral leg swelling  Mental Status: oriented, alert, coherent, logical, thought processes intact, and able to concentrate and follow conversation  Arrived from:Home  Imaging:   XR CHEST PORTABLE   Final Result   Possible mild pulmonary edema. Right lower lobe airspace opacity may   represent superimposed pneumonia.        Abnormal labs:   Abnormal Labs Reviewed   CBC WITH AUTO DIFFERENTIAL - Abnormal; Notable for the following components:       Result Value    RBC 2.70 (*)     Hemoglobin 7.2 (*)     Hematocrit 23.7 (*)     RDW 16.6 (*)     Eosinophils % 20 (*)     Eosinophils Absolute 1.1 (*)     All other components within normal limits   COMPREHENSIVE METABOLIC PANEL - Abnormal; Notable for the following components:    Chloride 109 (*)     Anion Gap 2 (*)     Glucose 118 (*)     BUN 27 (*)     Creatinine 1.29 (*)     Bun/Cre Ratio 21 (*)     Est, Glom Filt Rate 40 (*)     AST 14 (*)     ALT 8 (*)     Albumin 2.4 (*)     Albumin/Globulin Ratio 0.6 (*)     All other components within normal limits   BRAIN NATRIURETIC PEPTIDE - Abnormal; Notable for the following components:    NT Pro-BNP 6,855 (*)     All other components within normal limits       Background  Allergies:   Allergies   Allergen Reactions    Prednisone Itching and Rash     Pt stated does not have any allergies and has taken prednisone with no reaction.      History:   Past Medical History:   Diagnosis Date    Arthritis     Chronic kidney disease     pt unaware of stage; GFR 30 per pateint    Hx of

## 2024-04-10 NOTE — CONSULTS
Consultation    NAME: Cally Stephenson   :  7/10/1934   MRN:  [id number]     Date/Time:  4/10/2024 7:43 AM    Patient PCP: Toño Rosales Jr., APRN - NP  _______________________________________________________________________    Primary cardiologist: [facility]  (Augusto Shirley MD)    Problem list:   1.  Acute/chronic heart failure  2.  Mild mitral valve regurgitation  3.  Hypertension  4.  Bilateral lower extremity edema  5.  Elevated pro-BNP (6855 pg/mL)    6.  Chronic kidney disease stage IV  7.  Elevated TSH  8.  Anemia  9.  Hyperchloremia     []        High complexity decision making was performed        Subjective:   CHIEF COMPLAINT:     HISTORY OF PRESENT ILLNESS:     Cally Stephenson, is a very pleasant 89-year-old  female with no known coronary artery disease.  She previously was seen by cardiologist at [facility] (S).  She notes that she has not been seen in over 5 years.    She denies previous myocardial infarction, or congestive heart failure.  She does describe chronic bilateral lower extremity pitting edema.  Recently, her edema has increased.  She denies accompanying shortness of breath or dyspnea on exertion.  Further there is no chest pain, pressure or tightness.  Because of the lower extremity edema she presented to the emergency department.    In the emergency department her twelve-lead EKG was unremarkable.  Her high-sensitivity troponin I was also unremarkable (12-->15 ng/L).  Her pro-BNP was elevated to 6,855 pg/mL.  Her chest x-ray was consistent with possible pulmonary edema.  Her clinical history, physical exam, laboratory data and imaging studies was consistent with acute decompensated heart failure.  The patient is started on appropriate therapy and admitted to the telemetry unit.  Cardiology is consulted to assist in evaluation and management.      Past Medical History:   Diagnosis Date    Arthritis     Chronic

## 2024-04-10 NOTE — CARE COORDINATION
Maker:  No healthcare decision makers have been documented.  Click here to complete HealthCare Decision Makers including selection of the Healthcare Decision Maker Relationship (ie \"Primary\")   Today we documented Decision Maker(s) consistent with Legal Next of Kin hierarchy.    Content/Action Overview:  Has NO ACP documents-Information provided  Reviewed DNR/DNI and patient elects Full Code (Attempt Resuscitation)        Length of Voluntary ACP Conversation in minutes:  <16 minutes (Non-Billable)    GEORGE Mera

## 2024-04-10 NOTE — CONSULTS
NAME:  Cally Stephenson   :   7/10/1934   MRN:   [id number]     ATTENDING: Raymond SCHULTZ MD  PCP:  Toño Rosales Jr., DORENE - NP    Date/Time:  4/10/2024       Subjective:   REQUESTING PHYSICIAN: Dr. Raymond Borden  REASON FOR CONSULT: CKD      History of presenting illness:    Cally Stephenson is an 89-year-old female with past medical history including CKD, HTN, as well as those detailed below who presented to the emergency room with complaint of swelling in lower extremities worsening over the past couple weeks. CXR revealed possible mild pulmonary edema with possible RLL superimposed pneumonia. Initial labs showed creatinine of 1.29, improving to 1.11 today. Electrolytes are stable, patient is hemodynamically stable. She is admitted with acute on chronic congestive heart failure. Nephrology is consulted for management of BAO on CKD.    Patient was seen and examined in room. She is awake, alert and oriented, and in no acute distress. She reports that she follows with Dr. Diaz regularly and renal function has remained stable. She reports swelling in LE has improved since admission. Denies chest pain, shortness of breath, decreased appetite, N/V, fever or chills. She is on RA at time of visit. BLE with mild erythema and trace edema which appears to be improving.     Past Medical History:   Diagnosis Date    Arthritis     Chronic kidney disease     pt unaware of stage; GFR 30 per pateint    Hx of colonoscopy with polypectomy     benign    Hypertension     Mitral valve regurgitation 2011    mild per Dr. Augusto Shirley; pt stated leaky valve    Other ill-defined conditions(799.89)     cataracts    Other ill-defined conditions(799.89) 03    fx left ankle-boot only    Pneumonia      7 yrs ago    Varicose veins     left leg worse than right currently      Past Surgical History:   Procedure Laterality Date    APPENDECTOMY      BREAST SURGERY  1986    excision calcium deposit left breast    CATARACT

## 2024-04-10 NOTE — DISCHARGE INSTRUCTIONS
Heart Failure Follow-up protocol:  -Please schedule an appointment with your cardiologist(heart doctor) to be seen within 3-5 days of discharge.    -Please remember to call your cardiologist with any new, returning, or worsening symptoms. Please refer to your symptom chart. **Weigh yourself Daily in the morning, after using the bathroom, but before eating or drinking anything.    -Please follow your sodium restrictions to reduce the intake of salt/sodium as discussed. Remember when we take in too much salt/sodium, we hold on to fluid.    ____________________________________________________________________________

## 2024-04-10 NOTE — PROGRESS NOTES
4 Eyes Skin Assessment     NAME:  Cally Stephenson  YOB: 1934  MEDICAL RECORD NUMBER:  [id number]    The patient is being assessed for  Admission    I agree that at least one RN has performed a thorough Head to Toe Skin Assessment on the patient. ALL assessment sites listed below have been assessed.      Areas assessed by both nurses:    Head, Face, Ears, Shoulders, Back, Chest, Arms, Elbows, Hands, Sacrum. Buttock, Coccyx, Ischium, Legs. Feet and Heels, and Under Medical Devices         Does the Patient have a Wound? No noted wound(s)       Glen Prevention initiated by RN: Yes  Wound Care Orders initiated by RN: No    Pressure Injury (Stage 3,4, Unstageable, DTI, NWPT, and Complex wounds) if present, place Wound referral order by RN under : No    New Ostomies, if present place, Ostomy referral order under : No     Nurse 1 eSignature: Electronically signed by Dee Dee Dey RN on 4/9/24 at 11:02 PM EDT    **SHARE this note so that the co-signing nurse can place an eSignature**    Nurse 2 eSignature: Electronically signed by CHRISTINA NIELSEN RN on 4/9/24 at 11:14 PM EDT    Apply baby powder to the area before going to work. No signs of infection currently. Return for new or worsening condition such as fevers/chills, increasing, spreading redness.

## 2024-04-10 NOTE — PROGRESS NOTES
Department of Internal Medicine  General Internal Medicine  Attending Progress Note      SUBJECTIVE: Patient was admitted for severe CHF, chronic kidney disease stage III, swollen legs she feels better today denies any shortness of breath  is by the bedside    OBJECTIVE      Medications    Current Facility-Administered Medications: ascorbic acid (VITAMIN C) tablet 500 mg, 500 mg, Oral, Daily  potassium chloride (KLOR-CON M) extended release tablet 20 mEq, 20 mEq, Oral, BID  spironolactone (ALDACTONE) tablet 25 mg, 25 mg, Oral, Daily  Vitamin D (CHOLECALCIFEROL) tablet 1,000 Units, 1,000 Units, Oral, Daily  sodium chloride flush 0.9 % injection 5-40 mL, 5-40 mL, IntraVENous, 2 times per day  sodium chloride flush 0.9 % injection 5-40 mL, 5-40 mL, IntraVENous, PRN  0.9 % sodium chloride infusion, , IntraVENous, PRN  ondansetron (ZOFRAN-ODT) disintegrating tablet 4 mg, 4 mg, Oral, Q8H PRN **OR** ondansetron (ZOFRAN) injection 4 mg, 4 mg, IntraVENous, Q6H PRN  polyethylene glycol (GLYCOLAX) packet 17 g, 17 g, Oral, Daily PRN  acetaminophen (TYLENOL) tablet 650 mg, 650 mg, Oral, Q6H PRN **OR** acetaminophen (TYLENOL) suppository 650 mg, 650 mg, Rectal, Q6H PRN  enoxaparin Sodium (LOVENOX) injection 30 mg, 30 mg, SubCUTAneous, Daily  carvedilol (COREG) tablet 3.125 mg, 3.125 mg, Oral, BID WC  furosemide (LASIX) injection 40 mg, 40 mg, IntraVENous, BID  Physical    VITALS:  BP (!) 102/45   Pulse 74   Temp 97.2 °F (36.2 °C) (Oral)   Resp 18   Ht 1.626 m (5' 4\")   Wt 61.6 kg (135 lb 12.9 oz)   SpO2 96%   BMI 23.31 kg/m²   BLOOD PRESSURE RANGE:  Systolic (24hrs), Av , Min:102 , Max:145  ; Diastolic (24hrs), Av, Min:45, Max:80   CONSTITUTIONAL:  awake, alert, cooperative, no apparent distress, and appears stated age  EYES:  Lids and lashes normal, pupils equal, round and reactive to light, extra ocular muscles intact, sclera clear, conjunctiva normal  ENT:  Normocephalic, without obvious abnormality,

## 2024-04-11 ENCOUNTER — APPOINTMENT (OUTPATIENT)
Facility: HOSPITAL | Age: 89
DRG: 291 | End: 2024-04-11
Attending: INTERNAL MEDICINE
Payer: MEDICARE

## 2024-04-11 VITALS
OXYGEN SATURATION: 98 % | HEIGHT: 64 IN | BODY MASS INDEX: 23.18 KG/M2 | TEMPERATURE: 97.3 F | HEART RATE: 59 BPM | DIASTOLIC BLOOD PRESSURE: 42 MMHG | RESPIRATION RATE: 18 BRPM | SYSTOLIC BLOOD PRESSURE: 103 MMHG | WEIGHT: 135.8 LBS

## 2024-04-11 LAB
25(OH)D3 SERPL-MCNC: 31.9 NG/ML (ref 30–100)
ALBUMIN SERPL-MCNC: 2.2 G/DL (ref 3.5–5)
ANION GAP SERPL CALC-SCNC: 4 MMOL/L (ref 5–15)
ANION GAP SERPL CALC-SCNC: 5 MMOL/L (ref 5–15)
BASOPHILS # BLD: 0.1 K/UL (ref 0–0.1)
BASOPHILS NFR BLD: 1 % (ref 0–1)
BUN SERPL-MCNC: 30 MG/DL (ref 6–20)
BUN SERPL-MCNC: 31 MG/DL (ref 6–20)
BUN/CREAT SERPL: 23 (ref 12–20)
BUN/CREAT SERPL: 23 (ref 12–20)
CA-I BLD-MCNC: 8.4 MG/DL (ref 8.5–10.1)
CA-I BLD-MCNC: 8.6 MG/DL (ref 8.5–10.1)
CHLORIDE SERPL-SCNC: 108 MMOL/L (ref 97–108)
CHLORIDE SERPL-SCNC: 109 MMOL/L (ref 97–108)
CHOLEST SERPL-MCNC: 116 MG/DL
CK SERPL-CCNC: 30 U/L (ref 26–192)
CO2 SERPL-SCNC: 25 MMOL/L (ref 21–32)
CO2 SERPL-SCNC: 26 MMOL/L (ref 21–32)
CREAT SERPL-MCNC: 1.31 MG/DL (ref 0.55–1.02)
CREAT SERPL-MCNC: 1.35 MG/DL (ref 0.55–1.02)
DIFFERENTIAL METHOD BLD: ABNORMAL
ECHO AO ASC DIAM: 3.6 CM
ECHO AO ASCENDING AORTA INDEX: 2.17 CM/M2
ECHO AO ROOT DIAM: 3.5 CM
ECHO AO ROOT INDEX: 2.11 CM/M2
ECHO AR MAX VEL PISA: 2.7 M/S
ECHO AV AREA PEAK VELOCITY: 0.9 CM2
ECHO AV AREA VTI: 1 CM2
ECHO AV AREA/BSA PEAK VELOCITY: 0.5 CM2/M2
ECHO AV AREA/BSA VTI: 0.6 CM2/M2
ECHO AV MEAN GRADIENT: 14 MMHG
ECHO AV MEAN VELOCITY: 1.7 M/S
ECHO AV PEAK GRADIENT: 25 MMHG
ECHO AV PEAK VELOCITY: 2.5 M/S
ECHO AV REGURGITANT PHT: 476 MS
ECHO AV VELOCITY RATIO: 0.32
ECHO AV VTI: 60 CM
ECHO BSA: 1.66 M2
ECHO LA AREA 4C: 19.8 CM2
ECHO LA DIAMETER INDEX: 2.17 CM/M2
ECHO LA DIAMETER: 3.6 CM
ECHO LA MAJOR AXIS: 5.2 CM
ECHO LA TO AORTIC ROOT RATIO: 1.03
ECHO LA VOL MOD A4C: 62 ML (ref 22–52)
ECHO LA VOLUME INDEX MOD A4C: 37 ML/M2 (ref 16–34)
ECHO LV EDV A4C: 78 ML
ECHO LV EDV INDEX A4C: 47 ML/M2
ECHO LV EJECTION FRACTION A4C: 68 %
ECHO LV ESV A4C: 25 ML
ECHO LV ESV INDEX A4C: 15 ML/M2
ECHO LV FRACTIONAL SHORTENING: 38 % (ref 28–44)
ECHO LV GLOBAL LONGITUDINAL STRAIN (GLS): -12.8 %
ECHO LV GLOBAL LONGITUDINAL STRAIN (GLS): -13.2 %
ECHO LV GLOBAL LONGITUDINAL STRAIN (GLS): -14.1 %
ECHO LV GLOBAL LONGITUDINAL STRAIN (GLS): -16.4 %
ECHO LV INTERNAL DIMENSION DIASTOLE INDEX: 2.53 CM/M2
ECHO LV INTERNAL DIMENSION DIASTOLIC: 4.2 CM (ref 3.9–5.3)
ECHO LV INTERNAL DIMENSION SYSTOLIC INDEX: 1.57 CM/M2
ECHO LV INTERNAL DIMENSION SYSTOLIC: 2.6 CM
ECHO LV IVSD: 1 CM (ref 0.6–0.9)
ECHO LV MASS 2D: 147 G (ref 67–162)
ECHO LV MASS INDEX 2D: 88.6 G/M2 (ref 43–95)
ECHO LV POSTERIOR WALL DIASTOLIC: 1.1 CM (ref 0.6–0.9)
ECHO LV RELATIVE WALL THICKNESS RATIO: 0.52
ECHO LVOT AREA: 2.8 CM2
ECHO LVOT AV VTI INDEX: 0.35
ECHO LVOT DIAM: 1.9 CM
ECHO LVOT MEAN GRADIENT: 1 MMHG
ECHO LVOT PEAK GRADIENT: 3 MMHG
ECHO LVOT PEAK VELOCITY: 0.8 M/S
ECHO LVOT STROKE VOLUME INDEX: 36.2 ML/M2
ECHO LVOT SV: 60.1 ML
ECHO LVOT VTI: 21.2 CM
ECHO MV A VELOCITY: 0.85 M/S
ECHO MV E DECELERATION TIME (DT): 380 MS
ECHO MV E VELOCITY: 1.16 M/S
ECHO MV E/A RATIO: 1.36
ECHO MV REGURGITANT ALIASING (NYQUIST) VELOCITY: 31 CM/S
ECHO MV REGURGITANT PEAK GRADIENT: 85 MMHG
ECHO MV REGURGITANT PEAK VELOCITY: 4.6 M/S
ECHO MV REGURGITANT RADIUS PISA: 0.9 CM
ECHO MV REGURGITANT VTIA: 156 CM
ECHO PV MAX VELOCITY: 1.4 M/S
ECHO PV MEAN GRADIENT: 5 MMHG
ECHO PV MEAN VELOCITY: 1 M/S
ECHO PV PEAK GRADIENT: 8 MMHG
ECHO PV VTI: 30.9 CM
ECHO RA AREA 4C: 17.5 CM2
ECHO RA END SYSTOLIC VOLUME APICAL 4 CHAMBER INDEX BSA: 28 ML/M2
ECHO RA VOLUME: 47 ML
ECHO RV BASAL DIMENSION: 3.1 CM
ECHO RV LONGITUDINAL DIMENSION: 5.9 CM
ECHO RV MID DIMENSION: 4.5 CM
ECHO RVOT MEAN GRADIENT: 1 MMHG
ECHO RVOT PEAK GRADIENT: 1 MMHG
ECHO RVOT PEAK VELOCITY: 0.6 M/S
ECHO RVOT VTI: 13.2 CM
ECHO TV REGURGITANT MAX VELOCITY: 2.49 M/S
ECHO TV REGURGITANT PEAK GRADIENT: 25 MMHG
EOSINOPHIL # BLD: 1 K/UL (ref 0–0.4)
EOSINOPHIL NFR BLD: 18 % (ref 0–7)
ERYTHROCYTE [DISTWIDTH] IN BLOOD BY AUTOMATED COUNT: 16.8 % (ref 11.5–14.5)
FERRITIN SERPL-MCNC: 159 NG/ML (ref 8–252)
GLUCOSE SERPL-MCNC: 86 MG/DL (ref 65–100)
GLUCOSE SERPL-MCNC: 86 MG/DL (ref 65–100)
HCT VFR BLD AUTO: 25.6 % (ref 35–47)
HDLC SERPL-MCNC: 38 MG/DL
HDLC SERPL: 3.1 (ref 0–5)
HGB BLD-MCNC: 7.9 G/DL (ref 11.5–16)
IMM GRANULOCYTES # BLD AUTO: 0 K/UL (ref 0–0.04)
IMM GRANULOCYTES NFR BLD AUTO: 0 % (ref 0–0.5)
LDLC SERPL CALC-MCNC: 62.2 MG/DL (ref 0–100)
LIPID PANEL: NORMAL
LYMPHOCYTES # BLD: 0.9 K/UL (ref 0.8–3.5)
LYMPHOCYTES NFR BLD: 17 % (ref 12–49)
MAGNESIUM SERPL-MCNC: 1.7 MG/DL (ref 1.6–2.4)
MCH RBC QN AUTO: 27.2 PG (ref 26–34)
MCHC RBC AUTO-ENTMCNC: 30.9 G/DL (ref 30–36.5)
MCV RBC AUTO: 88.3 FL (ref 80–99)
MONOCYTES # BLD: 0.4 K/UL (ref 0–1)
MONOCYTES NFR BLD: 7 % (ref 5–13)
NEUTS SEG # BLD: 3 K/UL (ref 1.8–8)
NEUTS SEG NFR BLD: 57 % (ref 32–75)
NRBC # BLD: 0 K/UL (ref 0–0.01)
NRBC BLD-RTO: 0 PER 100 WBC
PHOSPHATE SERPL-MCNC: 4 MG/DL (ref 2.6–4.7)
PLATELET # BLD AUTO: 311 K/UL (ref 150–400)
POTASSIUM SERPL-SCNC: 4.3 MMOL/L (ref 3.5–5.1)
POTASSIUM SERPL-SCNC: 4.6 MMOL/L (ref 3.5–5.1)
RBC # BLD AUTO: 2.9 M/UL (ref 3.8–5.2)
RBC MORPH BLD: ABNORMAL
RBC MORPH BLD: ABNORMAL
SODIUM SERPL-SCNC: 137 MMOL/L (ref 136–145)
SODIUM SERPL-SCNC: 140 MMOL/L (ref 136–145)
TRIGL SERPL-MCNC: 79 MG/DL
VLDLC SERPL CALC-MCNC: 15.8 MG/DL
WBC # BLD AUTO: 5.4 K/UL (ref 3.6–11)

## 2024-04-11 PROCEDURE — 82550 ASSAY OF CK (CPK): CPT

## 2024-04-11 PROCEDURE — 93306 TTE W/DOPPLER COMPLETE: CPT

## 2024-04-11 PROCEDURE — 82306 VITAMIN D 25 HYDROXY: CPT

## 2024-04-11 PROCEDURE — 97165 OT EVAL LOW COMPLEX 30 MIN: CPT

## 2024-04-11 PROCEDURE — 80069 RENAL FUNCTION PANEL: CPT

## 2024-04-11 PROCEDURE — 83735 ASSAY OF MAGNESIUM: CPT

## 2024-04-11 PROCEDURE — 97530 THERAPEUTIC ACTIVITIES: CPT

## 2024-04-11 PROCEDURE — 6370000000 HC RX 637 (ALT 250 FOR IP): Performed by: INTERNAL MEDICINE

## 2024-04-11 PROCEDURE — 36415 COLL VENOUS BLD VENIPUNCTURE: CPT

## 2024-04-11 PROCEDURE — 2580000003 HC RX 258: Performed by: INTERNAL MEDICINE

## 2024-04-11 PROCEDURE — 6360000002 HC RX W HCPCS: Performed by: INTERNAL MEDICINE

## 2024-04-11 PROCEDURE — 80048 BASIC METABOLIC PNL TOTAL CA: CPT

## 2024-04-11 PROCEDURE — 82728 ASSAY OF FERRITIN: CPT

## 2024-04-11 PROCEDURE — 85025 COMPLETE CBC W/AUTO DIFF WBC: CPT

## 2024-04-11 RX ORDER — POLYETHYLENE GLYCOL 3350 17 G/17G
17 POWDER, FOR SOLUTION ORAL DAILY PRN
Qty: 30 PACKET | Refills: 0 | Status: SHIPPED | OUTPATIENT
Start: 2024-04-11 | End: 2024-05-11

## 2024-04-11 RX ORDER — FUROSEMIDE 40 MG/1
40 TABLET ORAL DAILY
Qty: 60 TABLET | Refills: 3 | Status: SHIPPED | OUTPATIENT
Start: 2024-04-11

## 2024-04-11 RX ORDER — POTASSIUM CHLORIDE 20 MEQ/1
20 TABLET, EXTENDED RELEASE ORAL 2 TIMES DAILY
Qty: 60 TABLET | Refills: 3 | Status: SHIPPED | OUTPATIENT
Start: 2024-04-11

## 2024-04-11 RX ORDER — CARVEDILOL 3.12 MG/1
3.12 TABLET ORAL 2 TIMES DAILY WITH MEALS
Qty: 60 TABLET | Refills: 3 | Status: SHIPPED | OUTPATIENT
Start: 2024-04-11

## 2024-04-11 RX ORDER — OXYCODONE HYDROCHLORIDE 5 MG/1
5 TABLET ORAL EVERY 8 HOURS PRN
Qty: 12 TABLET | Refills: 0 | Status: SHIPPED | OUTPATIENT
Start: 2024-04-11 | End: 2024-04-14

## 2024-04-11 RX ADMIN — OXYCODONE HYDROCHLORIDE AND ACETAMINOPHEN 500 MG: 500 TABLET ORAL at 08:19

## 2024-04-11 RX ADMIN — SPIRONOLACTONE 25 MG: 25 TABLET ORAL at 08:18

## 2024-04-11 RX ADMIN — SODIUM CHLORIDE, PRESERVATIVE FREE 10 ML: 5 INJECTION INTRAVENOUS at 10:10

## 2024-04-11 RX ADMIN — ENOXAPARIN SODIUM 30 MG: 100 INJECTION SUBCUTANEOUS at 08:19

## 2024-04-11 RX ADMIN — Medication 1000 UNITS: at 08:18

## 2024-04-11 RX ADMIN — CARVEDILOL 3.12 MG: 3.12 TABLET, FILM COATED ORAL at 08:18

## 2024-04-11 RX ADMIN — FUROSEMIDE 40 MG: 10 INJECTION, SOLUTION INTRAMUSCULAR; INTRAVENOUS at 08:19

## 2024-04-11 RX ADMIN — POTASSIUM CHLORIDE 20 MEQ: 1500 TABLET, EXTENDED RELEASE ORAL at 08:18

## 2024-04-11 RX ADMIN — OXYCODONE 5 MG: 5 TABLET ORAL at 00:09

## 2024-04-11 ASSESSMENT — PAIN DESCRIPTION - DESCRIPTORS: DESCRIPTORS: ACHING

## 2024-04-11 ASSESSMENT — PAIN DESCRIPTION - LOCATION: LOCATION: FOOT

## 2024-04-11 ASSESSMENT — PAIN SCALES - GENERAL: PAINLEVEL_OUTOF10: 7

## 2024-04-11 ASSESSMENT — PAIN DESCRIPTION - ORIENTATION: ORIENTATION: RIGHT;LEFT

## 2024-04-11 NOTE — PROGRESS NOTES
Discharge paperwork started. Put in next dose due date. Print and send with patient when patient is ready to leave.Primary nurse aware.

## 2024-04-11 NOTE — PROGRESS NOTES
Renal Progress Note    Patient: Cally Stephenson MRN: [id number]  SSN: [id number]    YOB: 1934  Age: 89 y.o.  Sex: female      Admit Date: 4/9/2024    LOS: 2 days     Subjective:   Patient seen at beside. Denies shortness of breath. Swelling in BLE has resolved.     Repeat labs show creatinine of 1.3.    Scheduled for discharge today.    Current Facility-Administered Medications   Medication Dose Route Frequency    epoetin alton-epbx (RETACRIT) injection 10,000 Units  10,000 Units SubCUTAneous Weekly    oxyCODONE (ROXICODONE) immediate release tablet 5 mg  5 mg Oral Q8H PRN    ascorbic acid (VITAMIN C) tablet 500 mg  500 mg Oral Daily    potassium chloride (KLOR-CON M) extended release tablet 20 mEq  20 mEq Oral BID    spironolactone (ALDACTONE) tablet 25 mg  25 mg Oral Daily    Vitamin D (CHOLECALCIFEROL) tablet 1,000 Units  1,000 Units Oral Daily    sodium chloride flush 0.9 % injection 5-40 mL  5-40 mL IntraVENous 2 times per day    sodium chloride flush 0.9 % injection 5-40 mL  5-40 mL IntraVENous PRN    0.9 % sodium chloride infusion   IntraVENous PRN    ondansetron (ZOFRAN-ODT) disintegrating tablet 4 mg  4 mg Oral Q8H PRN    Or    ondansetron (ZOFRAN) injection 4 mg  4 mg IntraVENous Q6H PRN    polyethylene glycol (GLYCOLAX) packet 17 g  17 g Oral Daily PRN    acetaminophen (TYLENOL) tablet 650 mg  650 mg Oral Q6H PRN    Or    acetaminophen (TYLENOL) suppository 650 mg  650 mg Rectal Q6H PRN    enoxaparin Sodium (LOVENOX) injection 30 mg  30 mg SubCUTAneous Daily    carvedilol (COREG) tablet 3.125 mg  3.125 mg Oral BID WC    furosemide (LASIX) injection 40 mg  40 mg IntraVENous BID        Vitals:    04/11/24 0039 04/11/24 0415 04/11/24 0802 04/11/24 1018   BP:  (!) 121/53 (!) 127/53 (!) 127/53   Pulse:  69 66 66   Resp: 15 18 18 18   Temp:  98.5 °F (36.9 °C) 97.5 °F (36.4 °C) 97.5 °F (36.4 °C)   TempSrc:  Oral Axillary Axillary   SpO2:  95% 95%    Weight:       Height:         Objective:

## 2024-04-11 NOTE — DISCHARGE SUMMARY
Discharge Summary    Cally Stephenson  :  7/10/1934  MRN:  [id number]    ADMIT DATE:  2024  DISCHARGE DATE:  2024    PRIMARY CARE PHYSICIAN:  Toño Rosales Jr., APRN - NP    VISIT STATUS: Admission    CODE STATUS:  Full Code    DISCHARGE DIAGNOSES:  Principal Problem:    Heart failure exacerbated by sotalol (HCC)  Active Problems:    Acute combined systolic and diastolic CHF, NYHA class 3 (HCC)  Resolved Problems:    * No resolved hospital problems. *  Bilateral leg swelling  Chronic kidney failure    HOSPITAL COURSE:  59 years old has history of CKD mitral valve regurgitation hypertension brought in for swelling of the legs and acute on chronic kidney failure patient was seen by cardiology assessment of acute kidney injury on CKD cardiorenal etiology was started on IV Lasix and Aldactone she was also diagnosed with chronic kidney disease stage III AP, hypertension lower extremity edema with acute on chronic congestive heart failure patient was seen by cardiology she denies any chest pain or shortness of breath  SIGNIFICANT DIAGNOSTIC STUDIES:    CONSULTANTS:  Tio Torrez  RECOMMENDED NEXT STEPS:        DISCHARGE MEDICATIONS:         Medication List        START taking these medications      carvedilol 3.125 MG tablet  Commonly known as: COREG  Take 1 tablet by mouth 2 times daily (with meals)     oxyCODONE 5 MG immediate release tablet  Commonly known as: ROXICODONE  Take 1 tablet by mouth every 8 hours as needed for Pain for up to 3 days. Max Daily Amount: 15 mg     polyethylene glycol 17 g packet  Commonly known as: GLYCOLAX  Take 1 packet by mouth daily as needed for Constipation            CHANGE how you take these medications      furosemide 40 MG tablet  Commonly known as: Lasix  Take 1 tablet by mouth daily  What changed:   medication strength  how much to take  Another medication with the same name was removed. Continue taking this medication, and follow the directions you see here.

## 2024-04-11 NOTE — PROGRESS NOTES
Patient received discharge instructions and informed to follow up with patient's cardiologist. IV and tele removed. Patient to discharge home self care

## 2024-04-11 NOTE — PLAN OF CARE
Problem: Discharge Planning  Goal: Discharge to home or other facility with appropriate resources  4/10/2024 1948 by Dee Dee Saldaña RN  Outcome: Progressing  4/10/2024 0807 by Leanne Hernandez LPN  Outcome: Progressing     Problem: Skin/Tissue Integrity  Goal: Absence of new skin breakdown  Description: 1.  Monitor for areas of redness and/or skin breakdown  2.  Assess vascular access sites hourly  3.  Every 4-6 hours minimum:  Change oxygen saturation probe site  4.  Every 4-6 hours:  If on nasal continuous positive airway pressure, respiratory therapy assess nares and determine need for appliance change or resting period.  4/10/2024 1948 by Dee Dee Saldaña RN  Outcome: Progressing  4/10/2024 0807 by Leanne Hernandez LPN  Outcome: Progressing     Problem: ABCDS Injury Assessment  Goal: Absence of physical injury  4/10/2024 1948 by Dee Dee Saldaña RN  Outcome: Progressing  4/10/2024 0807 by Leanne Hernandez LPN  Outcome: Progressing     Problem: Safety - Adult  Goal: Free from fall injury  4/10/2024 1948 by Dee Dee Saldaña RN  Outcome: Progressing  4/10/2024 0807 by Leanne Hernandez LPN  Outcome: Progressing     Problem: Pain  Goal: Verbalizes/displays adequate comfort level or baseline comfort level  Outcome: Progressing     
  Problem: Discharge Planning  Goal: Discharge to home or other facility with appropriate resources  4/11/2024 1327 by Chandni De La Cruz RN  Outcome: Completed  4/11/2024 1018 by Elly Gaston RN  Outcome: Progressing     Problem: Skin/Tissue Integrity  Goal: Absence of new skin breakdown  Description: 1.  Monitor for areas of redness and/or skin breakdown  2.  Assess vascular access sites hourly  3.  Every 4-6 hours minimum:  Change oxygen saturation probe site  4.  Every 4-6 hours:  If on nasal continuous positive airway pressure, respiratory therapy assess nares and determine need for appliance change or resting period.  4/11/2024 1327 by Chandni De La Cruz, RN  Outcome: Completed  4/11/2024 1018 by Elly Gaston RN  Outcome: Progressing     Problem: ABCDS Injury Assessment  Goal: Absence of physical injury  4/11/2024 1327 by Chandni De La Cruz, RN  Outcome: Completed  4/11/2024 1018 by Elly Gaston RN  Outcome: Progressing     Problem: Safety - Adult  Goal: Free from fall injury  Outcome: Completed     Problem: Pain  Goal: Verbalizes/displays adequate comfort level or baseline comfort level  4/11/2024 1327 by Chandni De La Cruz, RN  Outcome: Completed  4/11/2024 1018 by Elly Gaston RN  Outcome: Progressing     
  Problem: Discharge Planning  Goal: Discharge to home or other facility with appropriate resources  Outcome: Progressing     Problem: Skin/Tissue Integrity  Goal: Absence of new skin breakdown  Description: 1.  Monitor for areas of redness and/or skin breakdown  2.  Assess vascular access sites hourly  3.  Every 4-6 hours minimum:  Change oxygen saturation probe site  4.  Every 4-6 hours:  If on nasal continuous positive airway pressure, respiratory therapy assess nares and determine need for appliance change or resting period.  Outcome: Progressing     Problem: ABCDS Injury Assessment  Goal: Absence of physical injury  Outcome: Progressing     Problem: Pain  Goal: Verbalizes/displays adequate comfort level or baseline comfort level  Outcome: Progressing     
  Problem: Discharge Planning  Goal: Discharge to home or other facility with appropriate resources  Outcome: Progressing     Problem: Skin/Tissue Integrity  Goal: Absence of new skin breakdown  Description: 1.  Monitor for areas of redness and/or skin breakdown  2.  Assess vascular access sites hourly  3.  Every 4-6 hours minimum:  Change oxygen saturation probe site  4.  Every 4-6 hours:  If on nasal continuous positive airway pressure, respiratory therapy assess nares and determine need for appliance change or resting period.  Outcome: Progressing     Problem: ABCDS Injury Assessment  Goal: Absence of physical injury  Outcome: Progressing     Problem: Safety - Adult  Goal: Free from fall injury  Outcome: Progressing     
  Social/Functional History  Lives With: Spouse  Type of Home: House  Home Layout: One level  Home Access: Stairs to enter with rails  Entrance Stairs - Number of Steps: 3  Entrance Stairs - Rails: Both  Bathroom Shower/Tub: Shower chair with back, Walk-in shower  Bathroom Equipment: Grab bars in shower, Shower chair  Home Equipment: Walker, rolling, Cane  Receives Help From: Family  ADL Assistance: Independent  Homemaking Assistance: Independent  Ambulation Assistance: Independent  Transfer Assistance: Independent  Active : No  Patient's  Info: spouse  Mode of Transportation: Car    Hand Dominance: right     EXAMINATION OF PERFORMANCE DEFICITS:    Cognitive/Behavioral Status:  Orientation  Orientation Level: Oriented X4  Cognition  Overall Cognitive Status: Exceptions  Arousal/Alertness: Appropriate responses to stimuli  Following Commands: Follows multistep commands consistently  Attention Span: Appears intact  Safety Judgement: Decreased awareness of need for assistance  Problem Solving: Decreased awareness of errors  Initiation: Requires cues for some  Sequencing: Requires cues for some    Range of Motion:     AROM: Generally decreased, functional (RUE ~90 degrees shoulder flexion with 7/10 pain reported. LUE decreased, functional)       Strength:    Strength: Generally decreased, functional (BUE)    Coordination:  Coordination: Within functional limits            Tone & Sensation:     Tone: Normal         Functional Mobility and Transfers for ADLs:  Bed Mobility:  Bed Mobility Training  Bed Mobility Training: No    Transfers:  Transfer Training  Transfer Training: Yes  Interventions: Verbal cues;Safety awareness training  Sit to Stand: Minimum assistance;Additional time  Stand to Sit: Minimum assistance;Additional time  Stand Pivot Transfers: Contact-guard assistance;Additional time;Adaptive equipment  Bed to Chair: Contact-guard assistance;Additional time;Adaptive equipment  Toilet Transfer:

## 2024-04-11 NOTE — PROGRESS NOTES
Progress Note      4/11/2024 7:40 AM  NAME: Cally Stephenson   MRN:  [id number]   Admit Diagnosis: Heart failure exacerbated by sotalol (HCC) [I50.9]  Acute combined systolic and diastolic CHF, NYHA class 3 ([facility]) [I50.41]  Acute on chronic congestive heart failure, unspecified heart failure type ([facility]) [I50.9]      Primary cardiologist: [facility]  (Augusto Shirley MD)     Problem list:    1.  Acute/chronic heart failure  2.  Mild mitral valve regurgitation  3.  Hypertension  4.  Bilateral lower extremity edema  5.  Elevated pro-BNP (6855 pg/mL)     6.  Chronic kidney disease stage IV  7.  Elevated TSH  8.  Anemia  9.  Hyperchloremia      Subjective:     Cally Stephenson, is seen and examined in room 482.  There were no acute cardiovascular events reported overnight.  The patient is examined sitting in the chair at the bedside.  She denies any current cardiovascular complaints.  Specifically, she denies chest pain or shortness of breath.  Further, she describes decreased lower extremity edema.    She is asking \"when can I go home\".    Awaiting complete 2D echocardiogram.    Medications Personally Reviewed:    Current Facility-Administered Medications   Medication Dose Route Frequency    epoetin alton-epbx (RETACRIT) injection 10,000 Units  10,000 Units SubCUTAneous Weekly    oxyCODONE (ROXICODONE) immediate release tablet 5 mg  5 mg Oral Q8H PRN    ascorbic acid (VITAMIN C) tablet 500 mg  500 mg Oral Daily    potassium chloride (KLOR-CON M) extended release tablet 20 mEq  20 mEq Oral BID    spironolactone (ALDACTONE) tablet 25 mg  25 mg Oral Daily    Vitamin D (CHOLECALCIFEROL) tablet 1,000 Units  1,000 Units Oral Daily    sodium chloride flush 0.9 % injection 5-40 mL  5-40 mL IntraVENous 2 times per day    sodium chloride flush 0.9 % injection 5-40 mL  5-40 mL IntraVENous PRN    0.9 % sodium chloride infusion   IntraVENous PRN    ondansetron (ZOFRAN-ODT) disintegrating tablet 4 mg

## 2024-04-12 NOTE — PROGRESS NOTES
Physician Progress Note      PATIENT:               VICKI LUEVANO  [facility] #:                  [id number]  :                       7/10/1934  ADMIT DATE:       2024 4:44 PM  DISCH DATE:        2024 2:37 PM  RESPONDING  PROVIDER #:        Raymond Borden MD          QUERY TEXT:    Patient admitted with acute on chronic CHF.  Noted documentation of Acute   Kidney Injury in DC Summary and consult notes.  In order to support the   diagnosis of BAO, please include additional clinical indicators in your   documentation.? Or please document if the diagnosis of BAO has been ruled out   after further study.    The medical record reflects the following:  Risk Factors: 88 y/o, CKD    Clinical Indicators:  * Cr This Admission: 1.29-> 1.11-> 1.35-> 1.31  * Previous Cr 3/19 1.36    Treatment: Serial BMPs and Nephrology consults    Defined by Kidney Disease Improving Global Outcomes (KDIGO) clinical practice   guideline for acute kidney injury:  -Increase in SCr by greater than or equal to 0.3 mg/dl within 48 hours; or  -Increase or decrease in SCr to greater than or equal to 1.5 times baseline,   which is known or presumed to have occurred within the prior 7 days; or  -Urine volume < 0.5ml/kg/h for 6 hours.    Thank you,  Chandni YING, RN, CCDS  Please contact me for any questions or concerns regarding this query at   [email or web address]  Options provided:  -- Acute kidney injury evidenced by, Please document evidence as well as a   numerical baseline creatinine, if known.  -- Acute kidney injury ruled out after study  -- Other - I will add my own diagnosis  -- Disagree - Not applicable / Not valid  -- Disagree - Clinically unable to determine / Unknown  -- Refer to Clinical Documentation Reviewer    PROVIDER RESPONSE TEXT:    Provider disagreed with this query.  Cr not greater than 03    Query created by: Chandni White on 2024 12:44 PM      Electronically signed by:  Raymond Borden MD 2024 8:59

## (undated) DEVICE — CP LAG SCREW (T8)
Type: IMPLANTABLE DEVICE | Site: FOOT | Status: NON-FUNCTIONAL
Brand: ANCHORAGE
Removed: 2022-02-18

## (undated) DEVICE — DRILL BIT, AO, SCALED: Brand: VARIAX

## (undated) DEVICE — SOLUTION SURG PREP 26 CC PURPREP

## (undated) DEVICE — PADDING CAST W6INXL4YD SYNTHETIC NATURAL PROTOUCH

## (undated) DEVICE — REAMER FOR CROSS-PLATES: Brand: ANCHORAGE

## (undated) DEVICE — SCREWDRIVER SURG STD FOR CAPPING K WIRE W SER PIN BALL

## (undated) DEVICE — GLOVE ORANGE PI 7 1/2   MSG9075

## (undated) DEVICE — SUTURE MCRYL + SZ 3-0 L27IN ABSRB UD L26MM SH 1/2 CIR MCP416H

## (undated) DEVICE — HOLDING PIN: Brand: ANCHORAGE

## (undated) DEVICE — ADAPTER MON OD15X22MM ID15MM STD LUER FIT W/ LIFESAVER

## (undated) DEVICE — PREP PAD BNS: Brand: CONVERTORS

## (undated) DEVICE — CUFF TRNQT ADH 18X4 IN 2 FILL LL

## (undated) DEVICE — GLOVE SURG SZ 8 L12IN FNGR THK79MIL GRN LTX FREE

## (undated) DEVICE — PRECISION THIN (9.0 X 0.38 X 25.0MM)

## (undated) DEVICE — INTENDED FOR TISSUE SEPARATION, AND OTHER PROCEDURES THAT REQUIRE A SHARP SURGICAL BLADE TO PUNCTURE OR CUT.: Brand: BARD-PARKER ® CARBON RIB-BACK BLADES

## (undated) DEVICE — SPONGE GZ W4XL4IN COT 12 PLY TYP VII WVN C FLD DSGN

## (undated) DEVICE — SOLUTION IRRIG 500ML 0.9% SOD CHL USP POUR PLAS BTL

## (undated) DEVICE — SPEEDGUIDE DRILL AO: Brand: VARIAX

## (undated) DEVICE — TUBING SUCTION UNIV 3/16 INX20 FT W/ SCALLOPED CONN STRL

## (undated) DEVICE — BONE SCREW, FULLY THREADED, T8
Type: IMPLANTABLE DEVICE | Site: FOOT | Status: NON-FUNCTIONAL
Brand: VARIAX
Removed: 2022-02-18

## (undated) DEVICE — SOUTHSIDE TURNOVER: Brand: MEDLINE INDUSTRIES, INC.

## (undated) DEVICE — PADDING CAST 4 INX5 YD STRL

## (undated) DEVICE — MINOR EXTREMITY PACK: Brand: MEDLINE INDUSTRIES, INC.

## (undated) DEVICE — GARMENT,MEDLINE,DVT,INT,CALF,MED, GEN2: Brand: MEDLINE

## (undated) DEVICE — PADDING CST 6IN STERILE --

## (undated) DEVICE — DRAPE,ORTHOMAX,EXTREMITY: Brand: MEDLINE

## (undated) DEVICE — Device: Brand: LIGHT GUARD™ FLEXIBLE LIGHT HANDLE COVER (2 EACH/PKG)

## (undated) DEVICE — BASIC SINGLE BASIN-LF: Brand: MEDLINE INDUSTRIES, INC.

## (undated) DEVICE — STOCKINETTE,DOUBLE PLY,6X48,STERILE: Brand: MEDLINE